# Patient Record
Sex: MALE | Race: WHITE | NOT HISPANIC OR LATINO | Employment: FULL TIME | URBAN - METROPOLITAN AREA
[De-identification: names, ages, dates, MRNs, and addresses within clinical notes are randomized per-mention and may not be internally consistent; named-entity substitution may affect disease eponyms.]

---

## 2018-01-01 ENCOUNTER — ANESTHESIA (OUTPATIENT)
Dept: GASTROENTEROLOGY | Facility: HOSPITAL | Age: 63
End: 2018-01-01

## 2018-01-01 ENCOUNTER — ANESTHESIA EVENT (OUTPATIENT)
Dept: GASTROENTEROLOGY | Facility: HOSPITAL | Age: 63
End: 2018-01-01

## 2018-01-01 ENCOUNTER — HOSPITAL ENCOUNTER (OUTPATIENT)
Facility: HOSPITAL | Age: 63
Setting detail: HOSPITAL OUTPATIENT SURGERY
Discharge: HOME | End: 2018-05-23
Attending: SURGERY | Admitting: SURGERY

## 2018-01-01 VITALS
RESPIRATION RATE: 16 BRPM | OXYGEN SATURATION: 100 % | SYSTOLIC BLOOD PRESSURE: 115 MMHG | DIASTOLIC BLOOD PRESSURE: 61 MMHG | HEIGHT: 68 IN | BODY MASS INDEX: 20.16 KG/M2 | HEART RATE: 68 BPM | WEIGHT: 133 LBS

## 2018-01-01 PROCEDURE — 45378 DIAGNOSTIC COLONOSCOPY: CPT | Performed by: SURGERY

## 2018-01-01 PROCEDURE — 25010000002 PROPOFOL 10 MG/ML EMULSION: Performed by: ANESTHESIOLOGY

## 2018-01-01 PROCEDURE — 25010000002 GLUCAGON (RDNA) PER 1 MG: Performed by: SURGERY

## 2018-01-01 RX ORDER — SODIUM CHLORIDE 0.9 % (FLUSH) 0.9 %
3 SYRINGE (ML) INJECTION AS NEEDED
Status: DISCONTINUED | OUTPATIENT
Start: 2018-01-01 | End: 2018-01-01 | Stop reason: HOSPADM

## 2018-01-01 RX ORDER — SODIUM CHLORIDE, SODIUM LACTATE, POTASSIUM CHLORIDE, CALCIUM CHLORIDE 600; 310; 30; 20 MG/100ML; MG/100ML; MG/100ML; MG/100ML
1000 INJECTION, SOLUTION INTRAVENOUS CONTINUOUS
Status: DISCONTINUED | OUTPATIENT
Start: 2018-01-01 | End: 2018-01-01 | Stop reason: HOSPADM

## 2018-01-01 RX ORDER — LIDOCAINE HYDROCHLORIDE 10 MG/ML
0.5 INJECTION, SOLUTION INFILTRATION; PERINEURAL ONCE AS NEEDED
Status: DISCONTINUED | OUTPATIENT
Start: 2018-01-01 | End: 2018-01-01 | Stop reason: HOSPADM

## 2018-01-01 RX ORDER — PROPOFOL 10 MG/ML
VIAL (ML) INTRAVENOUS AS NEEDED
Status: DISCONTINUED | OUTPATIENT
Start: 2018-01-01 | End: 2018-01-01 | Stop reason: SURG

## 2018-01-01 RX ADMIN — SODIUM CHLORIDE, POTASSIUM CHLORIDE, SODIUM LACTATE AND CALCIUM CHLORIDE 1000 ML: 600; 310; 30; 20 INJECTION, SOLUTION INTRAVENOUS at 10:06

## 2018-01-01 RX ADMIN — PROPOFOL 270 MG: 10 INJECTION, EMULSION INTRAVENOUS at 10:45

## 2018-01-01 RX ADMIN — SODIUM CHLORIDE, POTASSIUM CHLORIDE, SODIUM LACTATE AND CALCIUM CHLORIDE: 600; 310; 30; 20 INJECTION, SOLUTION INTRAVENOUS at 10:45

## 2018-03-01 ENCOUNTER — PREP FOR SURGERY (OUTPATIENT)
Dept: OTHER | Facility: HOSPITAL | Age: 63
End: 2018-03-01

## 2018-03-01 DIAGNOSIS — Z85.048 HISTORY OF RECTAL CANCER: Primary | ICD-10-CM

## 2018-03-05 PROBLEM — Z85.048 HISTORY OF RECTAL CANCER: Status: ACTIVE | Noted: 2018-03-05

## 2018-05-23 NOTE — DISCHARGE INSTRUCTIONS
For the next 24 hours patient needs to be with a responsible adult.    For 24 hours DO NOT drive, operate machinery, appliances, drink alcohol, make important decisions or sign legal documents.    Start with a light or bland diet and advance to regular diet as tolerated.    Call Dr Arreola for problems [phone]    Problems may include but not limited to: large amounts of bleeding, trouble breathing, repeated vomiting, severe unrelieved pain, fever or chills.

## 2018-05-23 NOTE — OP NOTE
PREOPERATIVE DIAGNOSIS:  High-risk screening secondary to personal history of rectal cancer    POSTOPERATIVE DIAGNOSIS AND FINDINGS:  Normal    PROCEDURE:  Colonoscopy to cecum     SURGEON:  Ben Arreola MD    ANESTHESIA:  MAC    SPECIMEN(S):  none    DESCRIPTION:  In decubitus position digital rectal exam was normal. Colonoscope inserted under direct visualization of lumen to cecum confirmed by visualization of ileocecal valve and appendiceal orifice.    Scope slowly withdrawn circumferentially examining all mucosal surfaces.    Quality of bowel preparation good.    There were no mucosal abnormalities.     Tolerated well.    RECOMMENDATION FOR FUTURE SURVEILLANCE:  5 years    Ben Arreola M.D.

## 2018-05-23 NOTE — ANESTHESIA PREPROCEDURE EVALUATION
Anesthesia Evaluation     Patient summary reviewed and Nursing notes reviewed   NPO Solid Status: > 8 hours  NPO Liquid Status: > 4 hours           Airway   Mallampati: II  TM distance: >3 FB  Neck ROM: full  no difficulty expected  Dental - normal exam     Pulmonary - normal exam   Cardiovascular - normal exam        Neuro/Psych  GI/Hepatic/Renal/Endo    (+)   hepatitis C, liver disease,     Musculoskeletal     Abdominal  - normal exam   Substance History      OB/GYN          Other      history of cancer remission                    Anesthesia Plan    ASA 3     MAC     Anesthetic plan and risks discussed with patient.

## 2018-05-23 NOTE — ANESTHESIA POSTPROCEDURE EVALUATION
"Patient: Jamel Rojas    Procedure Summary     Date:  05/23/18 Room / Location:  [facility] ENDOSCOPY 1 /  [facility]    Anesthesia Start:  1046 Anesthesia Stop:  1100    Procedure:  COLONOSCOPY TO CECUM (N/A ) Diagnosis:       History of rectal cancer      (History of rectal cancer [Z85.048])    Surgeon:  Ben Arreola MD Provider:  Cedric Sanchez MD    Anesthesia Type:  MAC ASA Status:  3          Anesthesia Type: MAC  Last vitals  BP   129/82 (05/23/18 0958)   Temp       Pulse   53 (05/23/18 0958)   Resp   15 (05/23/18 0958)     SpO2   100 % (05/23/18 0958)     Post Anesthesia Care and Evaluation    Patient location during evaluation: bedside  Patient participation: complete - patient participated  Level of consciousness: awake and alert  Pain management: adequate  Airway patency: patent  Anesthetic complications: No anesthetic complications    Cardiovascular status: acceptable  Respiratory status: acceptable  Hydration status: acceptable    Comments: /82 (BP Location: Left arm, Patient Position: Lying)   Pulse 53   Resp 15   Ht 172.7 cm (68\")   Wt 60.3 kg (133 lb)   SpO2 100%   BMI 20.22 kg/m²       "

## 2018-05-23 NOTE — H&P
HPI:  Personal history of rectal cancer status post laparoscopic low anterior resection    PMH, PSH, MEDS AND ALLERGIES reviewed and reconciled with EPIC    PHYSICAL EXAM:  -  Constitutional:  no acute distress  -  Respiratory:  normal inspiratory effort  -  Cardiovascular: regular rate  -  Gastrointestinal: Soft    ASSESSMENT/PLAN:    Colonoscopy    Ben Arreola M.D.

## 2019-01-01 ENCOUNTER — APPOINTMENT (OUTPATIENT)
Dept: GENERAL RADIOLOGY | Facility: HOSPITAL | Age: 64
End: 2019-01-01

## 2019-01-01 ENCOUNTER — APPOINTMENT (OUTPATIENT)
Dept: MRI IMAGING | Facility: HOSPITAL | Age: 64
End: 2019-01-01

## 2019-01-01 ENCOUNTER — APPOINTMENT (OUTPATIENT)
Dept: CT IMAGING | Facility: HOSPITAL | Age: 64
End: 2019-01-01

## 2019-01-01 ENCOUNTER — HOSPITAL ENCOUNTER (INPATIENT)
Facility: HOSPITAL | Age: 64
LOS: 7 days | End: 2019-03-18
Attending: EMERGENCY MEDICINE | Admitting: INTERNAL MEDICINE

## 2019-01-01 ENCOUNTER — HOSPITAL ENCOUNTER (INPATIENT)
Facility: HOSPITAL | Age: 64
LOS: 6 days | End: 2019-03-24
Attending: INTERNAL MEDICINE | Admitting: INTERNAL MEDICINE

## 2019-01-01 VITALS
RESPIRATION RATE: 18 BRPM | WEIGHT: 108 LBS | OXYGEN SATURATION: 94 % | HEIGHT: 68 IN | DIASTOLIC BLOOD PRESSURE: 76 MMHG | TEMPERATURE: 98.5 F | SYSTOLIC BLOOD PRESSURE: 113 MMHG | HEART RATE: 83 BPM | BODY MASS INDEX: 16.37 KG/M2

## 2019-01-01 VITALS — SYSTOLIC BLOOD PRESSURE: 103 MMHG | OXYGEN SATURATION: 93 % | DIASTOLIC BLOOD PRESSURE: 71 MMHG | TEMPERATURE: 98.1 F

## 2019-01-01 DIAGNOSIS — R26.2 DIFFICULTY WALKING: ICD-10-CM

## 2019-01-01 DIAGNOSIS — G93.89 BRAIN MASS: ICD-10-CM

## 2019-01-01 DIAGNOSIS — R41.82 ALTERED MENTAL STATUS, UNSPECIFIED ALTERED MENTAL STATUS TYPE: ICD-10-CM

## 2019-01-01 DIAGNOSIS — R41.82 ALTERED MENTAL STATUS, UNSPECIFIED ALTERED MENTAL STATUS TYPE: Primary | ICD-10-CM

## 2019-01-01 LAB
ALBUMIN SERPL-MCNC: 3.8 G/DL (ref 3.5–5.2)
ALBUMIN SERPL-MCNC: 4 G/DL (ref 3.5–5.2)
ALBUMIN SERPL-MCNC: 4.6 G/DL (ref 3.5–5.2)
ALBUMIN/GLOB SERPL: 1.5 G/DL
ALBUMIN/GLOB SERPL: 1.7 G/DL
ALBUMIN/GLOB SERPL: 1.8 G/DL
ALBUMIN/GLOB SERPL: 1.9 G/DL
ALBUMIN/GLOB SERPL: 1.9 G/DL
ALP SERPL-CCNC: 68 U/L (ref 39–117)
ALP SERPL-CCNC: 69 U/L (ref 39–117)
ALP SERPL-CCNC: 76 U/L (ref 39–117)
ALP SERPL-CCNC: 78 U/L (ref 39–117)
ALP SERPL-CCNC: 91 U/L (ref 39–117)
ALT SERPL W P-5'-P-CCNC: 16 U/L (ref 1–41)
ALT SERPL W P-5'-P-CCNC: 16 U/L (ref 1–41)
ALT SERPL W P-5'-P-CCNC: 19 U/L (ref 1–41)
ALT SERPL W P-5'-P-CCNC: 20 U/L (ref 1–41)
ALT SERPL W P-5'-P-CCNC: 23 U/L (ref 1–41)
AMPHET+METHAMPHET UR QL: NEGATIVE
ANION GAP SERPL CALCULATED.3IONS-SCNC: 11.9 MMOL/L
ANION GAP SERPL CALCULATED.3IONS-SCNC: 12.2 MMOL/L
ANION GAP SERPL CALCULATED.3IONS-SCNC: 14.7 MMOL/L
ANION GAP SERPL CALCULATED.3IONS-SCNC: 23.5 MMOL/L
ANION GAP SERPL CALCULATED.3IONS-SCNC: 8.9 MMOL/L
APAP SERPL-MCNC: <5 MCG/ML (ref 10–30)
AST SERPL-CCNC: 17 U/L (ref 1–40)
AST SERPL-CCNC: 19 U/L (ref 1–40)
AST SERPL-CCNC: 19 U/L (ref 1–40)
AST SERPL-CCNC: 21 U/L (ref 1–40)
AST SERPL-CCNC: 23 U/L (ref 1–40)
BACTERIA UR QL AUTO: ABNORMAL /HPF
BARBITURATES UR QL SCN: NEGATIVE
BASOPHILS # BLD AUTO: 0 10*3/MM3 (ref 0–0.2)
BASOPHILS # BLD AUTO: 0.01 10*3/MM3 (ref 0–0.2)
BASOPHILS # BLD AUTO: 0.02 10*3/MM3 (ref 0–0.2)
BASOPHILS NFR BLD AUTO: 0 % (ref 0–1.5)
BASOPHILS NFR BLD AUTO: 0.1 % (ref 0–1.5)
BASOPHILS NFR BLD AUTO: 0.2 % (ref 0–1.5)
BENZODIAZ UR QL SCN: NEGATIVE
BILIRUB SERPL-MCNC: 0.8 MG/DL (ref 0.1–1.2)
BILIRUB SERPL-MCNC: 0.8 MG/DL (ref 0.1–1.2)
BILIRUB SERPL-MCNC: 1.5 MG/DL (ref 0.1–1.2)
BILIRUB SERPL-MCNC: 1.6 MG/DL (ref 0.1–1.2)
BILIRUB SERPL-MCNC: 2.6 MG/DL (ref 0.1–1.2)
BILIRUB UR QL STRIP: NEGATIVE
BUN BLD-MCNC: 19 MG/DL (ref 8–23)
BUN BLD-MCNC: 20 MG/DL (ref 8–23)
BUN BLD-MCNC: 21 MG/DL (ref 8–23)
BUN BLD-MCNC: 28 MG/DL (ref 8–23)
BUN BLD-MCNC: 29 MG/DL (ref 8–23)
BUN/CREAT SERPL: 22.4 (ref 7–25)
BUN/CREAT SERPL: 23.5 (ref 7–25)
BUN/CREAT SERPL: 23.7 (ref 7–25)
BUN/CREAT SERPL: 24.7 (ref 7–25)
BUN/CREAT SERPL: 32.6 (ref 7–25)
CALCIUM SPEC-SCNC: 10.6 MG/DL (ref 8.6–10.5)
CALCIUM SPEC-SCNC: 9.1 MG/DL (ref 8.6–10.5)
CALCIUM SPEC-SCNC: 9.6 MG/DL (ref 8.6–10.5)
CALCIUM SPEC-SCNC: 9.6 MG/DL (ref 8.6–10.5)
CALCIUM SPEC-SCNC: 9.7 MG/DL (ref 8.6–10.5)
CANNABINOIDS SERPL QL: POSITIVE
CHLORIDE SERPL-SCNC: 103 MMOL/L (ref 98–107)
CHLORIDE SERPL-SCNC: 111 MMOL/L (ref 98–107)
CHLORIDE SERPL-SCNC: 113 MMOL/L (ref 98–107)
CHLORIDE SERPL-SCNC: 114 MMOL/L (ref 98–107)
CHLORIDE SERPL-SCNC: 117 MMOL/L (ref 98–107)
CK SERPL-CCNC: 201 U/L (ref 20–200)
CLARITY UR: ABNORMAL
CO2 SERPL-SCNC: 21.5 MMOL/L (ref 22–29)
CO2 SERPL-SCNC: 24.3 MMOL/L (ref 22–29)
CO2 SERPL-SCNC: 24.8 MMOL/L (ref 22–29)
CO2 SERPL-SCNC: 26.1 MMOL/L (ref 22–29)
CO2 SERPL-SCNC: 28.1 MMOL/L (ref 22–29)
COCAINE UR QL: NEGATIVE
COLOR UR: YELLOW
CREAT BLD-MCNC: 0.85 MG/DL (ref 0.76–1.27)
CREAT BLD-MCNC: 0.89 MG/DL (ref 0.76–1.27)
CREAT BLD-MCNC: 1.18 MG/DL (ref 0.76–1.27)
DEPRECATED RDW RBC AUTO: 45.1 FL (ref 37–54)
DEPRECATED RDW RBC AUTO: 45.2 FL (ref 37–54)
DEPRECATED RDW RBC AUTO: 46.1 FL (ref 37–54)
EOSINOPHIL # BLD AUTO: 0 10*3/MM3 (ref 0–0.4)
EOSINOPHIL NFR BLD AUTO: 0 % (ref 0.3–6.2)
ERYTHROCYTE [DISTWIDTH] IN BLOOD BY AUTOMATED COUNT: 13.1 % (ref 12.3–15.4)
ERYTHROCYTE [DISTWIDTH] IN BLOOD BY AUTOMATED COUNT: 13.1 % (ref 12.3–15.4)
ERYTHROCYTE [DISTWIDTH] IN BLOOD BY AUTOMATED COUNT: 13.3 % (ref 12.3–15.4)
ETHANOL BLD-MCNC: <10 MG/DL (ref 0–10)
ETHANOL UR QL: <0.01 %
GFR SERPL CREATININE-BSD FRML MDRD: 63 ML/MIN/1.73
GFR SERPL CREATININE-BSD FRML MDRD: 87 ML/MIN/1.73
GFR SERPL CREATININE-BSD FRML MDRD: 92 ML/MIN/1.73
GLOBULIN UR ELPH-MCNC: 2 GM/DL
GLOBULIN UR ELPH-MCNC: 2 GM/DL
GLOBULIN UR ELPH-MCNC: 2.1 GM/DL
GLOBULIN UR ELPH-MCNC: 2.6 GM/DL
GLOBULIN UR ELPH-MCNC: 2.7 GM/DL
GLUCOSE BLD-MCNC: 153 MG/DL (ref 65–99)
GLUCOSE BLD-MCNC: 168 MG/DL (ref 65–99)
GLUCOSE BLD-MCNC: 89 MG/DL (ref 65–99)
GLUCOSE BLD-MCNC: 89 MG/DL (ref 65–99)
GLUCOSE BLD-MCNC: 92 MG/DL (ref 65–99)
GLUCOSE BLDC GLUCOMTR-MCNC: 119 MG/DL (ref 70–130)
GLUCOSE BLDC GLUCOMTR-MCNC: 123 MG/DL (ref 70–130)
GLUCOSE BLDC GLUCOMTR-MCNC: 125 MG/DL (ref 70–130)
GLUCOSE BLDC GLUCOMTR-MCNC: 126 MG/DL (ref 70–130)
GLUCOSE BLDC GLUCOMTR-MCNC: 136 MG/DL (ref 70–130)
GLUCOSE BLDC GLUCOMTR-MCNC: 139 MG/DL (ref 70–130)
GLUCOSE BLDC GLUCOMTR-MCNC: 146 MG/DL (ref 70–130)
GLUCOSE BLDC GLUCOMTR-MCNC: 189 MG/DL (ref 70–130)
GLUCOSE BLDC GLUCOMTR-MCNC: 211 MG/DL (ref 70–130)
GLUCOSE BLDC GLUCOMTR-MCNC: 217 MG/DL (ref 70–130)
GLUCOSE BLDC GLUCOMTR-MCNC: 263 MG/DL (ref 70–130)
GLUCOSE BLDC GLUCOMTR-MCNC: 76 MG/DL (ref 70–130)
GLUCOSE BLDC GLUCOMTR-MCNC: 81 MG/DL (ref 70–130)
GLUCOSE BLDC GLUCOMTR-MCNC: 87 MG/DL (ref 70–130)
GLUCOSE BLDC GLUCOMTR-MCNC: 88 MG/DL (ref 70–130)
GLUCOSE BLDC GLUCOMTR-MCNC: 88 MG/DL (ref 70–130)
GLUCOSE UR STRIP-MCNC: NEGATIVE MG/DL
HCT VFR BLD AUTO: 44 % (ref 37.5–51)
HCT VFR BLD AUTO: 47.8 % (ref 37.5–51)
HCT VFR BLD AUTO: 53.6 % (ref 37.5–51)
HGB BLD-MCNC: 14.8 G/DL (ref 13–17.7)
HGB BLD-MCNC: 16.2 G/DL (ref 13–17.7)
HGB BLD-MCNC: 18 G/DL (ref 13–17.7)
HGB UR QL STRIP.AUTO: ABNORMAL
HYALINE CASTS UR QL AUTO: ABNORMAL /LPF
IMM GRANULOCYTES # BLD AUTO: 0.03 10*3/MM3 (ref 0–0.05)
IMM GRANULOCYTES # BLD AUTO: 0.03 10*3/MM3 (ref 0–0.05)
IMM GRANULOCYTES # BLD AUTO: 0.07 10*3/MM3 (ref 0–0.05)
IMM GRANULOCYTES NFR BLD AUTO: 0.3 % (ref 0–0.5)
IMM GRANULOCYTES NFR BLD AUTO: 0.3 % (ref 0–0.5)
IMM GRANULOCYTES NFR BLD AUTO: 0.6 % (ref 0–0.5)
INR PPP: 1.13 (ref 0.9–1.1)
KETONES UR QL STRIP: ABNORMAL
LEUKOCYTE ESTERASE UR QL STRIP.AUTO: ABNORMAL
LYMPHOCYTES # BLD AUTO: 0.36 10*3/MM3 (ref 0.7–3.1)
LYMPHOCYTES # BLD AUTO: 0.39 10*3/MM3 (ref 0.7–3.1)
LYMPHOCYTES # BLD AUTO: 0.61 10*3/MM3 (ref 0.7–3.1)
LYMPHOCYTES NFR BLD AUTO: 3.3 % (ref 19.6–45.3)
LYMPHOCYTES NFR BLD AUTO: 4.2 % (ref 19.6–45.3)
LYMPHOCYTES NFR BLD AUTO: 5.9 % (ref 19.6–45.3)
MCH RBC QN AUTO: 31.6 PG (ref 26.6–33)
MCH RBC QN AUTO: 31.6 PG (ref 26.6–33)
MCH RBC QN AUTO: 31.8 PG (ref 26.6–33)
MCHC RBC AUTO-ENTMCNC: 33.6 G/DL (ref 31.5–35.7)
MCHC RBC AUTO-ENTMCNC: 33.6 G/DL (ref 31.5–35.7)
MCHC RBC AUTO-ENTMCNC: 33.9 G/DL (ref 31.5–35.7)
MCV RBC AUTO: 93.7 FL (ref 79–97)
MCV RBC AUTO: 94 FL (ref 79–97)
MCV RBC AUTO: 94 FL (ref 79–97)
METHADONE UR QL SCN: NEGATIVE
MONOCYTES # BLD AUTO: 0.21 10*3/MM3 (ref 0.1–0.9)
MONOCYTES # BLD AUTO: 0.39 10*3/MM3 (ref 0.1–0.9)
MONOCYTES # BLD AUTO: 0.59 10*3/MM3 (ref 0.1–0.9)
MONOCYTES NFR BLD AUTO: 2.4 % (ref 5–12)
MONOCYTES NFR BLD AUTO: 3.3 % (ref 5–12)
MONOCYTES NFR BLD AUTO: 5.7 % (ref 5–12)
NEUTROPHILS # BLD AUTO: 11.13 10*3/MM3 (ref 1.4–7)
NEUTROPHILS # BLD AUTO: 8.07 10*3/MM3 (ref 1.4–7)
NEUTROPHILS # BLD AUTO: 9.16 10*3/MM3 (ref 1.4–7)
NEUTROPHILS NFR BLD AUTO: 87.9 % (ref 42.7–76)
NEUTROPHILS NFR BLD AUTO: 92.7 % (ref 42.7–76)
NEUTROPHILS NFR BLD AUTO: 93.1 % (ref 42.7–76)
NITRITE UR QL STRIP: NEGATIVE
NRBC BLD AUTO-RTO: 0 /100 WBC (ref 0–0)
OPIATES UR QL: NEGATIVE
OXYCODONE UR QL SCN: NEGATIVE
PH UR STRIP.AUTO: 5.5 [PH] (ref 5–8)
PLATELET # BLD AUTO: 181 10*3/MM3 (ref 140–450)
PLATELET # BLD AUTO: 216 10*3/MM3 (ref 140–450)
PLATELET # BLD AUTO: 244 10*3/MM3 (ref 140–450)
PMV BLD AUTO: 9.5 FL (ref 6–12)
PMV BLD AUTO: 9.6 FL (ref 6–12)
PMV BLD AUTO: 9.7 FL (ref 6–12)
POTASSIUM BLD-SCNC: 3.6 MMOL/L (ref 3.5–5.2)
POTASSIUM BLD-SCNC: 3.9 MMOL/L (ref 3.5–5.2)
POTASSIUM BLD-SCNC: 3.9 MMOL/L (ref 3.5–5.2)
POTASSIUM BLD-SCNC: 4 MMOL/L (ref 3.5–5.2)
POTASSIUM BLD-SCNC: 4.1 MMOL/L (ref 3.5–5.2)
PROT SERPL-MCNC: 5.8 G/DL (ref 6–8.5)
PROT SERPL-MCNC: 5.8 G/DL (ref 6–8.5)
PROT SERPL-MCNC: 5.9 G/DL (ref 6–8.5)
PROT SERPL-MCNC: 6.6 G/DL (ref 6–8.5)
PROT SERPL-MCNC: 7.3 G/DL (ref 6–8.5)
PROT UR QL STRIP: ABNORMAL
PROTHROMBIN TIME: 14.3 SECONDS (ref 11.7–14.2)
RBC # BLD AUTO: 4.68 10*6/MM3 (ref 4.14–5.8)
RBC # BLD AUTO: 5.1 10*6/MM3 (ref 4.14–5.8)
RBC # BLD AUTO: 5.7 10*6/MM3 (ref 4.14–5.8)
RBC # UR: ABNORMAL /HPF
REF LAB TEST METHOD: ABNORMAL
SALICYLATES SERPL-MCNC: <0.3 MG/DL
SODIUM BLD-SCNC: 148 MMOL/L (ref 136–145)
SODIUM BLD-SCNC: 151 MMOL/L (ref 136–145)
SODIUM BLD-SCNC: 151 MMOL/L (ref 136–145)
SODIUM BLD-SCNC: 152 MMOL/L (ref 136–145)
SODIUM BLD-SCNC: 152 MMOL/L (ref 136–145)
SP GR UR STRIP: 1.02 (ref 1–1.03)
SQUAMOUS #/AREA URNS HPF: ABNORMAL /HPF
UROBILINOGEN UR QL STRIP: ABNORMAL
WBC NRBC COR # BLD: 10.41 10*3/MM3 (ref 3.4–10.8)
WBC NRBC COR # BLD: 11.99 10*3/MM3 (ref 3.4–10.8)
WBC NRBC COR # BLD: 8.67 10*3/MM3 (ref 3.4–10.8)
WBC UR QL AUTO: ABNORMAL /HPF

## 2019-01-01 PROCEDURE — 25010000002 DEXAMETHASONE SODIUM PHOSPHATE 20 MG/5ML SOLUTION: Performed by: NURSE PRACTITIONER

## 2019-01-01 PROCEDURE — 25010000003 LEVETIRACETAM IN NACL 0.75% 1000 MG/100ML SOLUTION: Performed by: INTERNAL MEDICINE

## 2019-01-01 PROCEDURE — 25010000002 HYDROMORPHONE PER 4 MG: Performed by: INTERNAL MEDICINE

## 2019-01-01 PROCEDURE — 80307 DRUG TEST PRSMV CHEM ANLYZR: CPT | Performed by: EMERGENCY MEDICINE

## 2019-01-01 PROCEDURE — 25010000002 HYDROMORPHONE 1 MG/ML SOLUTION: Performed by: INTERNAL MEDICINE

## 2019-01-01 PROCEDURE — 97110 THERAPEUTIC EXERCISES: CPT

## 2019-01-01 PROCEDURE — 94799 UNLISTED PULMONARY SVC/PX: CPT

## 2019-01-01 PROCEDURE — 25010000002 DEXAMETHASONE PER 1 MG: Performed by: INTERNAL MEDICINE

## 2019-01-01 PROCEDURE — 25010000002 LORAZEPAM PER 2 MG: Performed by: HOSPITALIST

## 2019-01-01 PROCEDURE — 85610 PROTHROMBIN TIME: CPT | Performed by: INTERNAL MEDICINE

## 2019-01-01 PROCEDURE — 82962 GLUCOSE BLOOD TEST: CPT

## 2019-01-01 PROCEDURE — 99231 SBSQ HOSP IP/OBS SF/LOW 25: CPT | Performed by: INTERNAL MEDICINE

## 2019-01-01 PROCEDURE — 63710000001 INSULIN LISPRO (HUMAN) PER 5 UNITS: Performed by: INTERNAL MEDICINE

## 2019-01-01 PROCEDURE — 80053 COMPREHEN METABOLIC PANEL: CPT | Performed by: INTERNAL MEDICINE

## 2019-01-01 PROCEDURE — 25010000002 IOPAMIDOL 61 % SOLUTION: Performed by: INTERNAL MEDICINE

## 2019-01-01 PROCEDURE — 93010 ELECTROCARDIOGRAM REPORT: CPT | Performed by: INTERNAL MEDICINE

## 2019-01-01 PROCEDURE — 25010000002 HYDROMORPHONE PER 4 MG: Performed by: HOSPITALIST

## 2019-01-01 PROCEDURE — 25010000002 LORAZEPAM PER 2 MG: Performed by: INTERNAL MEDICINE

## 2019-01-01 PROCEDURE — 99232 SBSQ HOSP IP/OBS MODERATE 35: CPT | Performed by: INTERNAL MEDICINE

## 2019-01-01 PROCEDURE — 99221 1ST HOSP IP/OBS SF/LOW 40: CPT | Performed by: INTERNAL MEDICINE

## 2019-01-01 PROCEDURE — 99222 1ST HOSP IP/OBS MODERATE 55: CPT | Performed by: INTERNAL MEDICINE

## 2019-01-01 PROCEDURE — 85025 COMPLETE CBC W/AUTO DIFF WBC: CPT | Performed by: INTERNAL MEDICINE

## 2019-01-01 PROCEDURE — 99238 HOSP IP/OBS DSCHRG MGMT 30/<: CPT | Performed by: INTERNAL MEDICINE

## 2019-01-01 PROCEDURE — 0 GADOBENATE DIMEGLUMINE 529 MG/ML SOLUTION: Performed by: INTERNAL MEDICINE

## 2019-01-01 PROCEDURE — 93005 ELECTROCARDIOGRAM TRACING: CPT | Performed by: EMERGENCY MEDICINE

## 2019-01-01 PROCEDURE — P9612 CATHETERIZE FOR URINE SPEC: HCPCS

## 2019-01-01 PROCEDURE — 74177 CT ABD & PELVIS W/CONTRAST: CPT

## 2019-01-01 PROCEDURE — 82550 ASSAY OF CK (CPK): CPT | Performed by: EMERGENCY MEDICINE

## 2019-01-01 PROCEDURE — 71260 CT THORAX DX C+: CPT

## 2019-01-01 PROCEDURE — 25010000002 DEXAMETHASONE SODIUM PHOSPHATE 20 MG/5ML SOLUTION: Performed by: INTERNAL MEDICINE

## 2019-01-01 PROCEDURE — 99232 SBSQ HOSP IP/OBS MODERATE 35: CPT | Performed by: NURSE PRACTITIONER

## 2019-01-01 PROCEDURE — 71045 X-RAY EXAM CHEST 1 VIEW: CPT

## 2019-01-01 PROCEDURE — 99254 IP/OBS CNSLTJ NEW/EST MOD 60: CPT | Performed by: NURSE PRACTITIONER

## 2019-01-01 PROCEDURE — 81001 URINALYSIS AUTO W/SCOPE: CPT | Performed by: EMERGENCY MEDICINE

## 2019-01-01 PROCEDURE — 25010000003 LEVETIRACETAM IN NACL 0.75% 1000 MG/100ML SOLUTION: Performed by: EMERGENCY MEDICINE

## 2019-01-01 PROCEDURE — 70450 CT HEAD/BRAIN W/O DYE: CPT

## 2019-01-01 PROCEDURE — 99232 SBSQ HOSP IP/OBS MODERATE 35: CPT | Performed by: NEUROLOGICAL SURGERY

## 2019-01-01 PROCEDURE — 25010000002 ONDANSETRON PER 1 MG: Performed by: INTERNAL MEDICINE

## 2019-01-01 PROCEDURE — 97162 PT EVAL MOD COMPLEX 30 MIN: CPT

## 2019-01-01 PROCEDURE — 99285 EMERGENCY DEPT VISIT HI MDM: CPT

## 2019-01-01 PROCEDURE — 80053 COMPREHEN METABOLIC PANEL: CPT | Performed by: EMERGENCY MEDICINE

## 2019-01-01 PROCEDURE — A9577 INJ MULTIHANCE: HCPCS | Performed by: INTERNAL MEDICINE

## 2019-01-01 PROCEDURE — 25010000002 CEFTRIAXONE PER 250 MG: Performed by: INTERNAL MEDICINE

## 2019-01-01 PROCEDURE — 70553 MRI BRAIN STEM W/O & W/DYE: CPT

## 2019-01-01 PROCEDURE — 85025 COMPLETE CBC W/AUTO DIFF WBC: CPT | Performed by: EMERGENCY MEDICINE

## 2019-01-01 PROCEDURE — 99239 HOSP IP/OBS DSCHRG MGMT >30: CPT | Performed by: INTERNAL MEDICINE

## 2019-01-01 RX ORDER — MORPHINE SULFATE 2 MG/ML
4 INJECTION, SOLUTION INTRAMUSCULAR; INTRAVENOUS
Status: CANCELLED | OUTPATIENT
Start: 2019-01-01 | End: 2019-03-25

## 2019-01-01 RX ORDER — LEVETIRACETAM 10 MG/ML
1000 INJECTION INTRAVASCULAR ONCE
Status: COMPLETED | OUTPATIENT
Start: 2019-01-01 | End: 2019-01-01

## 2019-01-01 RX ORDER — LEVETIRACETAM 10 MG/ML
1000 INJECTION INTRAVASCULAR EVERY 12 HOURS SCHEDULED
Status: DISCONTINUED | OUTPATIENT
Start: 2019-01-01 | End: 2019-01-01 | Stop reason: HOSPADM

## 2019-01-01 RX ORDER — POTASSIUM CHLORIDE 1.5 G/1.77G
40 POWDER, FOR SOLUTION ORAL AS NEEDED
Status: DISCONTINUED | OUTPATIENT
Start: 2019-01-01 | End: 2019-01-01

## 2019-01-01 RX ORDER — SENNA AND DOCUSATE SODIUM 50; 8.6 MG/1; MG/1
2 TABLET, FILM COATED ORAL NIGHTLY
Status: DISCONTINUED | OUTPATIENT
Start: 2019-01-01 | End: 2019-01-01

## 2019-01-01 RX ORDER — ACETAMINOPHEN 650 MG/1
650 SUPPOSITORY RECTAL EVERY 4 HOURS PRN
Status: CANCELLED | OUTPATIENT
Start: 2019-01-01

## 2019-01-01 RX ORDER — LEVETIRACETAM 10 MG/ML
1000 INJECTION INTRAVASCULAR EVERY 12 HOURS SCHEDULED
Status: CANCELLED | OUTPATIENT
Start: 2019-01-01

## 2019-01-01 RX ORDER — MORPHINE SULFATE 20 MG/ML
5 SOLUTION ORAL
Status: DISCONTINUED | OUTPATIENT
Start: 2019-01-01 | End: 2019-01-01 | Stop reason: HOSPADM

## 2019-01-01 RX ORDER — GLYCOPYRROLATE 0.2 MG/ML
0.4 INJECTION INTRAMUSCULAR; INTRAVENOUS
Status: DISCONTINUED | OUTPATIENT
Start: 2019-01-01 | End: 2019-01-01

## 2019-01-01 RX ORDER — DEXTROSE MONOHYDRATE 25 G/50ML
25 INJECTION, SOLUTION INTRAVENOUS
Status: DISCONTINUED | OUTPATIENT
Start: 2019-01-01 | End: 2019-01-01

## 2019-01-01 RX ORDER — GLYCOPYRROLATE 0.2 MG/ML
0.2 INJECTION INTRAMUSCULAR; INTRAVENOUS
Status: DISCONTINUED | OUTPATIENT
Start: 2019-01-01 | End: 2019-01-01

## 2019-01-01 RX ORDER — SCOLOPAMINE TRANSDERMAL SYSTEM 1 MG/1
1 PATCH, EXTENDED RELEASE TRANSDERMAL
Status: CANCELLED | OUTPATIENT
Start: 2019-01-01

## 2019-01-01 RX ORDER — DIPHENOXYLATE HYDROCHLORIDE AND ATROPINE SULFATE 2.5; .025 MG/1; MG/1
1 TABLET ORAL
Status: DISCONTINUED | OUTPATIENT
Start: 2019-01-01 | End: 2019-01-01 | Stop reason: HOSPADM

## 2019-01-01 RX ORDER — LORAZEPAM 2 MG/ML
2 INJECTION INTRAMUSCULAR
Status: CANCELLED | OUTPATIENT
Start: 2019-01-01 | End: 2019-03-25

## 2019-01-01 RX ORDER — LORAZEPAM 2 MG/ML
1 CONCENTRATE ORAL
Status: DISCONTINUED | OUTPATIENT
Start: 2019-01-01 | End: 2019-01-01 | Stop reason: HOSPADM

## 2019-01-01 RX ORDER — POTASSIUM CHLORIDE 750 MG/1
40 CAPSULE, EXTENDED RELEASE ORAL AS NEEDED
Status: DISCONTINUED | OUTPATIENT
Start: 2019-01-01 | End: 2019-01-01

## 2019-01-01 RX ORDER — LORAZEPAM 2 MG/ML
1 INJECTION INTRAMUSCULAR
Status: CANCELLED | OUTPATIENT
Start: 2019-01-01 | End: 2019-03-25

## 2019-01-01 RX ORDER — GLYCOPYRROLATE 0.2 MG/ML
0.4 INJECTION INTRAMUSCULAR; INTRAVENOUS
Status: DISCONTINUED | OUTPATIENT
Start: 2019-01-01 | End: 2019-01-01 | Stop reason: HOSPADM

## 2019-01-01 RX ORDER — ACETAMINOPHEN 160 MG/5ML
650 SOLUTION ORAL EVERY 4 HOURS PRN
Status: DISCONTINUED | OUTPATIENT
Start: 2019-01-01 | End: 2019-01-01 | Stop reason: HOSPADM

## 2019-01-01 RX ORDER — ACETAMINOPHEN 325 MG/1
650 TABLET ORAL EVERY 4 HOURS PRN
Status: DISCONTINUED | OUTPATIENT
Start: 2019-01-01 | End: 2019-01-01 | Stop reason: HOSPADM

## 2019-01-01 RX ORDER — POTASSIUM CHLORIDE 750 MG/1
40 CAPSULE, EXTENDED RELEASE ORAL AS NEEDED
Status: CANCELLED | OUTPATIENT
Start: 2019-01-01

## 2019-01-01 RX ORDER — GLYCOPYRROLATE 0.2 MG/ML
0.2 INJECTION INTRAMUSCULAR; INTRAVENOUS
Status: CANCELLED | OUTPATIENT
Start: 2019-01-01

## 2019-01-01 RX ORDER — HALOPERIDOL 2 MG/ML
1 SOLUTION ORAL EVERY 4 HOURS PRN
Status: DISCONTINUED | OUTPATIENT
Start: 2019-01-01 | End: 2019-01-01 | Stop reason: HOSPADM

## 2019-01-01 RX ORDER — DEXTROSE MONOHYDRATE 25 G/50ML
25 INJECTION, SOLUTION INTRAVENOUS
Status: CANCELLED | OUTPATIENT
Start: 2019-01-01

## 2019-01-01 RX ORDER — SENNA AND DOCUSATE SODIUM 50; 8.6 MG/1; MG/1
2 TABLET, FILM COATED ORAL NIGHTLY PRN
Status: CANCELLED | OUTPATIENT
Start: 2019-01-01

## 2019-01-01 RX ORDER — MORPHINE SULFATE 20 MG/ML
10 SOLUTION ORAL
Status: DISCONTINUED | OUTPATIENT
Start: 2019-01-01 | End: 2019-01-01 | Stop reason: HOSPADM

## 2019-01-01 RX ORDER — PROMETHAZINE HYDROCHLORIDE 12.5 MG/1
12.5 SUPPOSITORY RECTAL EVERY 4 HOURS PRN
Status: DISCONTINUED | OUTPATIENT
Start: 2019-01-01 | End: 2019-01-01

## 2019-01-01 RX ORDER — ACETAMINOPHEN 325 MG/1
650 TABLET ORAL EVERY 4 HOURS PRN
Status: CANCELLED | OUTPATIENT
Start: 2019-01-01

## 2019-01-01 RX ORDER — MORPHINE SULFATE 2 MG/ML
6 INJECTION, SOLUTION INTRAMUSCULAR; INTRAVENOUS
Status: DISCONTINUED | OUTPATIENT
Start: 2019-01-01 | End: 2019-01-01 | Stop reason: HOSPADM

## 2019-01-01 RX ORDER — LORAZEPAM 2 MG/ML
1 CONCENTRATE ORAL
Status: CANCELLED | OUTPATIENT
Start: 2019-01-01 | End: 2019-03-25

## 2019-01-01 RX ORDER — SODIUM CHLORIDE 0.9 % (FLUSH) 0.9 %
3-10 SYRINGE (ML) INJECTION AS NEEDED
Status: DISCONTINUED | OUTPATIENT
Start: 2019-01-01 | End: 2019-01-01

## 2019-01-01 RX ORDER — PROMETHAZINE HYDROCHLORIDE 25 MG/1
12.5 TABLET ORAL EVERY 4 HOURS PRN
Status: DISCONTINUED | OUTPATIENT
Start: 2019-01-01 | End: 2019-01-01 | Stop reason: HOSPADM

## 2019-01-01 RX ORDER — MORPHINE SULFATE 2 MG/ML
2 INJECTION, SOLUTION INTRAMUSCULAR; INTRAVENOUS
Status: CANCELLED | OUTPATIENT
Start: 2019-01-01 | End: 2019-03-25

## 2019-01-01 RX ORDER — PROMETHAZINE HYDROCHLORIDE 6.25 MG/5ML
12.5 SYRUP ORAL EVERY 4 HOURS PRN
Status: CANCELLED | OUTPATIENT
Start: 2019-01-01

## 2019-01-01 RX ORDER — NICOTINE POLACRILEX 4 MG
15 LOZENGE BUCCAL
Status: CANCELLED | OUTPATIENT
Start: 2019-01-01

## 2019-01-01 RX ORDER — POTASSIUM CHLORIDE 7.45 MG/ML
10 INJECTION INTRAVENOUS
Status: CANCELLED | OUTPATIENT
Start: 2019-01-01

## 2019-01-01 RX ORDER — PROMETHAZINE HYDROCHLORIDE 6.25 MG/5ML
12.5 SYRUP ORAL EVERY 4 HOURS PRN
Status: DISCONTINUED | OUTPATIENT
Start: 2019-01-01 | End: 2019-01-01 | Stop reason: HOSPADM

## 2019-01-01 RX ORDER — LORAZEPAM 2 MG/ML
0.5 CONCENTRATE ORAL
Status: DISCONTINUED | OUTPATIENT
Start: 2019-01-01 | End: 2019-01-01 | Stop reason: HOSPADM

## 2019-01-01 RX ORDER — ONDANSETRON 4 MG/1
4 TABLET, FILM COATED ORAL EVERY 6 HOURS PRN
Status: DISCONTINUED | OUTPATIENT
Start: 2019-01-01 | End: 2019-01-01

## 2019-01-01 RX ORDER — FAMOTIDINE 10 MG/ML
20 INJECTION, SOLUTION INTRAVENOUS DAILY
Status: DISCONTINUED | OUTPATIENT
Start: 2019-01-01 | End: 2019-01-01

## 2019-01-01 RX ORDER — LORAZEPAM 2 MG/ML
0.5 INJECTION INTRAMUSCULAR
Status: DISCONTINUED | OUTPATIENT
Start: 2019-01-01 | End: 2019-01-01 | Stop reason: HOSPADM

## 2019-01-01 RX ORDER — PROMETHAZINE HYDROCHLORIDE 6.25 MG/5ML
12.5 SYRUP ORAL EVERY 4 HOURS PRN
Status: DISCONTINUED | OUTPATIENT
Start: 2019-01-01 | End: 2019-01-01

## 2019-01-01 RX ORDER — ONDANSETRON 4 MG/1
4 TABLET, FILM COATED ORAL EVERY 6 HOURS PRN
Status: DISCONTINUED | OUTPATIENT
Start: 2019-01-01 | End: 2019-01-01 | Stop reason: HOSPADM

## 2019-01-01 RX ORDER — DEXAMETHASONE SODIUM PHOSPHATE 4 MG/ML
4 INJECTION, SOLUTION INTRA-ARTICULAR; INTRALESIONAL; INTRAMUSCULAR; INTRAVENOUS; SOFT TISSUE EVERY 12 HOURS
Status: DISCONTINUED | OUTPATIENT
Start: 2019-01-01 | End: 2019-01-01 | Stop reason: HOSPADM

## 2019-01-01 RX ORDER — DIPHENOXYLATE HYDROCHLORIDE AND ATROPINE SULFATE 2.5; .025 MG/1; MG/1
1 TABLET ORAL
Status: CANCELLED | OUTPATIENT
Start: 2019-01-01

## 2019-01-01 RX ORDER — ONDANSETRON 4 MG/1
4 TABLET, ORALLY DISINTEGRATING ORAL EVERY 6 HOURS PRN
Status: DISCONTINUED | OUTPATIENT
Start: 2019-01-01 | End: 2019-01-01 | Stop reason: HOSPADM

## 2019-01-01 RX ORDER — HALOPERIDOL 1 MG/1
1 TABLET ORAL EVERY 4 HOURS PRN
Status: DISCONTINUED | OUTPATIENT
Start: 2019-01-01 | End: 2019-01-01 | Stop reason: HOSPADM

## 2019-01-01 RX ORDER — ONDANSETRON 2 MG/ML
4 INJECTION INTRAMUSCULAR; INTRAVENOUS EVERY 6 HOURS PRN
Status: DISCONTINUED | OUTPATIENT
Start: 2019-01-01 | End: 2019-01-01 | Stop reason: HOSPADM

## 2019-01-01 RX ORDER — GLYCOPYRROLATE 0.2 MG/ML
0.2 INJECTION INTRAMUSCULAR; INTRAVENOUS
Status: DISCONTINUED | OUTPATIENT
Start: 2019-01-01 | End: 2019-01-01 | Stop reason: HOSPADM

## 2019-01-01 RX ORDER — SODIUM CHLORIDE 0.9 % (FLUSH) 0.9 %
3 SYRINGE (ML) INJECTION EVERY 12 HOURS SCHEDULED
Status: DISCONTINUED | OUTPATIENT
Start: 2019-01-01 | End: 2019-01-01

## 2019-01-01 RX ORDER — LORAZEPAM 2 MG/ML
2 INJECTION INTRAMUSCULAR
Status: DISCONTINUED | OUTPATIENT
Start: 2019-01-01 | End: 2019-01-01 | Stop reason: HOSPADM

## 2019-01-01 RX ORDER — HYDROMORPHONE HYDROCHLORIDE 1 MG/ML
0.5 INJECTION, SOLUTION INTRAMUSCULAR; INTRAVENOUS; SUBCUTANEOUS
Status: CANCELLED | OUTPATIENT
Start: 2019-01-01 | End: 2019-03-25

## 2019-01-01 RX ORDER — PROMETHAZINE HYDROCHLORIDE 25 MG/1
12.5 TABLET ORAL EVERY 4 HOURS PRN
Status: DISCONTINUED | OUTPATIENT
Start: 2019-01-01 | End: 2019-01-01

## 2019-01-01 RX ORDER — MORPHINE SULFATE 10 MG/ML
6 INJECTION INTRAMUSCULAR; INTRAVENOUS; SUBCUTANEOUS
Status: CANCELLED | OUTPATIENT
Start: 2019-01-01 | End: 2019-03-25

## 2019-01-01 RX ORDER — PROMETHAZINE HYDROCHLORIDE 25 MG/ML
12.5 INJECTION, SOLUTION INTRAMUSCULAR; INTRAVENOUS EVERY 4 HOURS PRN
Status: CANCELLED | OUTPATIENT
Start: 2019-01-01

## 2019-01-01 RX ORDER — CEFTRIAXONE SODIUM 1 G/50ML
1 INJECTION, SOLUTION INTRAVENOUS EVERY 24 HOURS
Status: DISCONTINUED | OUTPATIENT
Start: 2019-01-01 | End: 2019-01-01

## 2019-01-01 RX ORDER — PROMETHAZINE HYDROCHLORIDE 12.5 MG/1
12.5 SUPPOSITORY RECTAL EVERY 4 HOURS PRN
Status: DISCONTINUED | OUTPATIENT
Start: 2019-01-01 | End: 2019-01-01 | Stop reason: HOSPADM

## 2019-01-01 RX ORDER — HALOPERIDOL 1 MG/1
1 TABLET ORAL EVERY 4 HOURS PRN
Status: DISCONTINUED | OUTPATIENT
Start: 2019-01-01 | End: 2019-01-01

## 2019-01-01 RX ORDER — SENNA AND DOCUSATE SODIUM 50; 8.6 MG/1; MG/1
2 TABLET, FILM COATED ORAL NIGHTLY PRN
Status: DISCONTINUED | OUTPATIENT
Start: 2019-01-01 | End: 2019-01-01

## 2019-01-01 RX ORDER — MORPHINE SULFATE 20 MG/ML
20 SOLUTION ORAL
Status: CANCELLED | OUTPATIENT
Start: 2019-01-01 | End: 2019-03-25

## 2019-01-01 RX ORDER — LORAZEPAM 2 MG/ML
2 CONCENTRATE ORAL
Status: CANCELLED | OUTPATIENT
Start: 2019-01-01 | End: 2019-03-25

## 2019-01-01 RX ORDER — POTASSIUM CHLORIDE 7.45 MG/ML
10 INJECTION INTRAVENOUS
Status: DISCONTINUED | OUTPATIENT
Start: 2019-01-01 | End: 2019-01-01

## 2019-01-01 RX ORDER — LORAZEPAM 2 MG/ML
1 INJECTION INTRAMUSCULAR
Status: DISCONTINUED | OUTPATIENT
Start: 2019-01-01 | End: 2019-01-01 | Stop reason: HOSPADM

## 2019-01-01 RX ORDER — MORPHINE SULFATE 2 MG/ML
4 INJECTION, SOLUTION INTRAMUSCULAR; INTRAVENOUS
Status: DISCONTINUED | OUTPATIENT
Start: 2019-01-01 | End: 2019-01-01 | Stop reason: HOSPADM

## 2019-01-01 RX ORDER — MORPHINE SULFATE 20 MG/ML
20 SOLUTION ORAL
Status: DISCONTINUED | OUTPATIENT
Start: 2019-01-01 | End: 2019-01-01 | Stop reason: HOSPADM

## 2019-01-01 RX ORDER — DEXAMETHASONE SODIUM PHOSPHATE 4 MG/ML
4 INJECTION, SOLUTION INTRA-ARTICULAR; INTRALESIONAL; INTRAMUSCULAR; INTRAVENOUS; SOFT TISSUE EVERY 8 HOURS
Status: DISCONTINUED | OUTPATIENT
Start: 2019-01-01 | End: 2019-01-01

## 2019-01-01 RX ORDER — DEXTROSE AND SODIUM CHLORIDE 5; .45 G/100ML; G/100ML
50 INJECTION, SOLUTION INTRAVENOUS CONTINUOUS
Status: DISCONTINUED | OUTPATIENT
Start: 2019-01-01 | End: 2019-01-01

## 2019-01-01 RX ORDER — MORPHINE SULFATE 20 MG/ML
5 SOLUTION ORAL
Status: CANCELLED | OUTPATIENT
Start: 2019-01-01 | End: 2019-03-25

## 2019-01-01 RX ORDER — LORAZEPAM 2 MG/ML
2 CONCENTRATE ORAL
Status: DISCONTINUED | OUTPATIENT
Start: 2019-01-01 | End: 2019-01-01 | Stop reason: HOSPADM

## 2019-01-01 RX ORDER — ACETAMINOPHEN 650 MG/1
650 SUPPOSITORY RECTAL EVERY 4 HOURS PRN
Status: DISCONTINUED | OUTPATIENT
Start: 2019-01-01 | End: 2019-01-01

## 2019-01-01 RX ORDER — ONDANSETRON 2 MG/ML
4 INJECTION INTRAMUSCULAR; INTRAVENOUS EVERY 6 HOURS PRN
Status: CANCELLED | OUTPATIENT
Start: 2019-01-01

## 2019-01-01 RX ORDER — IPRATROPIUM BROMIDE AND ALBUTEROL SULFATE 2.5; .5 MG/3ML; MG/3ML
3 SOLUTION RESPIRATORY (INHALATION)
Status: DISCONTINUED | OUTPATIENT
Start: 2019-01-01 | End: 2019-01-01

## 2019-01-01 RX ORDER — HALOPERIDOL 2 MG/ML
1 SOLUTION ORAL EVERY 4 HOURS PRN
Status: DISCONTINUED | OUTPATIENT
Start: 2019-01-01 | End: 2019-01-01

## 2019-01-01 RX ORDER — GLYCOPYRROLATE 0.2 MG/ML
0.4 INJECTION INTRAMUSCULAR; INTRAVENOUS
Status: CANCELLED | OUTPATIENT
Start: 2019-01-01

## 2019-01-01 RX ORDER — ACETAMINOPHEN 160 MG/5ML
650 SOLUTION ORAL EVERY 4 HOURS PRN
Status: CANCELLED | OUTPATIENT
Start: 2019-01-01

## 2019-01-01 RX ORDER — ACETAMINOPHEN 650 MG/1
650 SUPPOSITORY RECTAL EVERY 4 HOURS PRN
Status: DISCONTINUED | OUTPATIENT
Start: 2019-01-01 | End: 2019-01-01 | Stop reason: HOSPADM

## 2019-01-01 RX ORDER — POTASSIUM CHLORIDE 1.5 G/1.77G
40 POWDER, FOR SOLUTION ORAL AS NEEDED
Status: CANCELLED | OUTPATIENT
Start: 2019-01-01

## 2019-01-01 RX ORDER — LORAZEPAM 2 MG/ML
0.5 CONCENTRATE ORAL
Status: CANCELLED | OUTPATIENT
Start: 2019-01-01 | End: 2019-03-25

## 2019-01-01 RX ORDER — HYDROMORPHONE HYDROCHLORIDE 1 MG/ML
0.5 INJECTION, SOLUTION INTRAMUSCULAR; INTRAVENOUS; SUBCUTANEOUS
Status: DISCONTINUED | OUTPATIENT
Start: 2019-01-01 | End: 2019-01-01 | Stop reason: HOSPADM

## 2019-01-01 RX ORDER — SCOLOPAMINE TRANSDERMAL SYSTEM 1 MG/1
1 PATCH, EXTENDED RELEASE TRANSDERMAL
Status: DISCONTINUED | OUTPATIENT
Start: 2019-01-01 | End: 2019-01-01 | Stop reason: HOSPADM

## 2019-01-01 RX ORDER — HALOPERIDOL 1 MG/1
1 TABLET ORAL EVERY 4 HOURS PRN
Status: CANCELLED | OUTPATIENT
Start: 2019-01-01

## 2019-01-01 RX ORDER — ONDANSETRON 4 MG/1
4 TABLET, ORALLY DISINTEGRATING ORAL EVERY 6 HOURS PRN
Status: CANCELLED | OUTPATIENT
Start: 2019-01-01

## 2019-01-01 RX ORDER — ACETAMINOPHEN 325 MG/1
650 TABLET ORAL EVERY 4 HOURS PRN
Status: DISCONTINUED | OUTPATIENT
Start: 2019-01-01 | End: 2019-01-01

## 2019-01-01 RX ORDER — DEXAMETHASONE SODIUM PHOSPHATE 4 MG/ML
4 INJECTION, SOLUTION INTRA-ARTICULAR; INTRALESIONAL; INTRAMUSCULAR; INTRAVENOUS; SOFT TISSUE EVERY 8 HOURS
Status: DISCONTINUED | OUTPATIENT
Start: 2019-01-01 | End: 2019-01-01 | Stop reason: HOSPADM

## 2019-01-01 RX ORDER — PROMETHAZINE HYDROCHLORIDE 25 MG/ML
12.5 INJECTION, SOLUTION INTRAMUSCULAR; INTRAVENOUS EVERY 4 HOURS PRN
Status: DISCONTINUED | OUTPATIENT
Start: 2019-01-01 | End: 2019-01-01

## 2019-01-01 RX ORDER — ONDANSETRON 4 MG/1
4 TABLET, FILM COATED ORAL EVERY 6 HOURS PRN
Status: CANCELLED | OUTPATIENT
Start: 2019-01-01

## 2019-01-01 RX ORDER — HALOPERIDOL 5 MG/ML
1 INJECTION INTRAMUSCULAR EVERY 4 HOURS PRN
Status: DISCONTINUED | OUTPATIENT
Start: 2019-01-01 | End: 2019-01-01

## 2019-01-01 RX ORDER — SODIUM CHLORIDE 0.9 % (FLUSH) 0.9 %
3-10 SYRINGE (ML) INJECTION AS NEEDED
Status: CANCELLED | OUTPATIENT
Start: 2019-01-01

## 2019-01-01 RX ORDER — NICOTINE POLACRILEX 4 MG
15 LOZENGE BUCCAL
Status: DISCONTINUED | OUTPATIENT
Start: 2019-01-01 | End: 2019-01-01

## 2019-01-01 RX ORDER — MORPHINE SULFATE 20 MG/ML
10 SOLUTION ORAL
Status: CANCELLED | OUTPATIENT
Start: 2019-01-01 | End: 2019-03-25

## 2019-01-01 RX ORDER — MORPHINE SULFATE 2 MG/ML
2 INJECTION, SOLUTION INTRAMUSCULAR; INTRAVENOUS
Status: DISCONTINUED | OUTPATIENT
Start: 2019-01-01 | End: 2019-01-01 | Stop reason: HOSPADM

## 2019-01-01 RX ORDER — HALOPERIDOL 5 MG/ML
1 INJECTION INTRAMUSCULAR EVERY 4 HOURS PRN
Status: DISCONTINUED | OUTPATIENT
Start: 2019-01-01 | End: 2019-01-01 | Stop reason: HOSPADM

## 2019-01-01 RX ORDER — HALOPERIDOL 2 MG/ML
1 SOLUTION ORAL EVERY 4 HOURS PRN
Status: CANCELLED | OUTPATIENT
Start: 2019-01-01

## 2019-01-01 RX ORDER — DEXAMETHASONE SODIUM PHOSPHATE 4 MG/ML
4 INJECTION, SOLUTION INTRA-ARTICULAR; INTRALESIONAL; INTRAMUSCULAR; INTRAVENOUS; SOFT TISSUE EVERY 8 HOURS
Status: CANCELLED | OUTPATIENT
Start: 2019-01-01

## 2019-01-01 RX ORDER — PROMETHAZINE HYDROCHLORIDE 12.5 MG/1
12.5 SUPPOSITORY RECTAL EVERY 4 HOURS PRN
Status: CANCELLED | OUTPATIENT
Start: 2019-01-01

## 2019-01-01 RX ORDER — SODIUM CHLORIDE 9 MG/ML
125 INJECTION, SOLUTION INTRAVENOUS CONTINUOUS
Status: DISCONTINUED | OUTPATIENT
Start: 2019-01-01 | End: 2019-01-01

## 2019-01-01 RX ORDER — ONDANSETRON 4 MG/1
4 TABLET, ORALLY DISINTEGRATING ORAL EVERY 6 HOURS PRN
Status: DISCONTINUED | OUTPATIENT
Start: 2019-01-01 | End: 2019-01-01

## 2019-01-01 RX ORDER — HALOPERIDOL 5 MG/ML
1 INJECTION INTRAMUSCULAR EVERY 4 HOURS PRN
Status: CANCELLED | OUTPATIENT
Start: 2019-01-01

## 2019-01-01 RX ORDER — LORAZEPAM 2 MG/ML
0.5 INJECTION INTRAMUSCULAR
Status: CANCELLED | OUTPATIENT
Start: 2019-01-01 | End: 2019-03-25

## 2019-01-01 RX ORDER — PROMETHAZINE HYDROCHLORIDE 25 MG/ML
12.5 INJECTION, SOLUTION INTRAMUSCULAR; INTRAVENOUS EVERY 4 HOURS PRN
Status: DISCONTINUED | OUTPATIENT
Start: 2019-01-01 | End: 2019-01-01 | Stop reason: HOSPADM

## 2019-01-01 RX ORDER — MORPHINE SULFATE 10 MG/ML
6 INJECTION INTRAMUSCULAR; INTRAVENOUS; SUBCUTANEOUS
Status: DISCONTINUED | OUTPATIENT
Start: 2019-01-01 | End: 2019-01-01 | Stop reason: HOSPADM

## 2019-01-01 RX ORDER — PROMETHAZINE HYDROCHLORIDE 25 MG/1
12.5 TABLET ORAL EVERY 4 HOURS PRN
Status: CANCELLED | OUTPATIENT
Start: 2019-01-01

## 2019-01-01 RX ADMIN — DEXAMETHASONE SODIUM PHOSPHATE 4 MG: 4 INJECTION, SOLUTION INTRA-ARTICULAR; INTRALESIONAL; INTRAMUSCULAR; INTRAVENOUS; SOFT TISSUE at 06:01

## 2019-01-01 RX ADMIN — LORAZEPAM 2 MG: 2 INJECTION INTRAMUSCULAR; INTRAVENOUS at 20:06

## 2019-01-01 RX ADMIN — POTASSIUM CHLORIDE 40 MEQ: 750 CAPSULE, EXTENDED RELEASE ORAL at 08:22

## 2019-01-01 RX ADMIN — LORAZEPAM 2 MG: 2 INJECTION INTRAMUSCULAR; INTRAVENOUS at 12:53

## 2019-01-01 RX ADMIN — ONDANSETRON HYDROCHLORIDE 4 MG: 2 SOLUTION INTRAMUSCULAR; INTRAVENOUS at 16:30

## 2019-01-01 RX ADMIN — DEXAMETHASONE SODIUM PHOSPHATE 4 MG: 4 INJECTION, SOLUTION INTRA-ARTICULAR; INTRALESIONAL; INTRAMUSCULAR; INTRAVENOUS; SOFT TISSUE at 05:11

## 2019-01-01 RX ADMIN — LORAZEPAM 1 MG: 2 INJECTION INTRAMUSCULAR; INTRAVENOUS at 20:12

## 2019-01-01 RX ADMIN — INSULIN LISPRO 2 UNITS: 100 INJECTION, SOLUTION INTRAVENOUS; SUBCUTANEOUS at 00:00

## 2019-01-01 RX ADMIN — HYDROMORPHONE HYDROCHLORIDE 0.5 MG: 1 INJECTION, SOLUTION INTRAMUSCULAR; INTRAVENOUS; SUBCUTANEOUS at 13:24

## 2019-01-01 RX ADMIN — LEVETIRACETAM 1000 MG: 10 INJECTION INTRAVENOUS at 16:45

## 2019-01-01 RX ADMIN — LORAZEPAM 1 MG: 2 INJECTION INTRAMUSCULAR; INTRAVENOUS at 09:07

## 2019-01-01 RX ADMIN — LEVETIRACETAM 1000 MG: 10 INJECTION INTRAVENOUS at 20:49

## 2019-01-01 RX ADMIN — LEVETIRACETAM 1000 MG: 10 INJECTION INTRAVENOUS at 09:07

## 2019-01-01 RX ADMIN — HYDROMORPHONE HYDROCHLORIDE 0.5 MG: 1 INJECTION, SOLUTION INTRAMUSCULAR; INTRAVENOUS; SUBCUTANEOUS at 00:55

## 2019-01-01 RX ADMIN — FAMOTIDINE 20 MG: 10 INJECTION INTRAVENOUS at 15:21

## 2019-01-01 RX ADMIN — LORAZEPAM 1 MG: 2 INJECTION INTRAMUSCULAR; INTRAVENOUS at 08:40

## 2019-01-01 RX ADMIN — DEXAMETHASONE SODIUM PHOSPHATE 4 MG: 4 INJECTION, SOLUTION INTRA-ARTICULAR; INTRALESIONAL; INTRAMUSCULAR; INTRAVENOUS; SOFT TISSUE at 06:25

## 2019-01-01 RX ADMIN — DEXAMETHASONE SODIUM PHOSPHATE 4 MG: 4 INJECTION, SOLUTION INTRA-ARTICULAR; INTRALESIONAL; INTRAMUSCULAR; INTRAVENOUS; SOFT TISSUE at 07:32

## 2019-01-01 RX ADMIN — DEXAMETHASONE SODIUM PHOSPHATE 4 MG: 4 INJECTION, SOLUTION INTRA-ARTICULAR; INTRALESIONAL; INTRAMUSCULAR; INTRAVENOUS; SOFT TISSUE at 09:00

## 2019-01-01 RX ADMIN — LEVETIRACETAM 1000 MG: 10 INJECTION INTRAVENOUS at 08:09

## 2019-01-01 RX ADMIN — DEXAMETHASONE SODIUM PHOSPHATE 4 MG: 4 INJECTION, SOLUTION INTRA-ARTICULAR; INTRALESIONAL; INTRAMUSCULAR; INTRAVENOUS; SOFT TISSUE at 18:27

## 2019-01-01 RX ADMIN — HYDROMORPHONE HYDROCHLORIDE 1 MG: 1 INJECTION, SOLUTION INTRAMUSCULAR; INTRAVENOUS; SUBCUTANEOUS at 12:08

## 2019-01-01 RX ADMIN — HYDROMORPHONE HYDROCHLORIDE 1 MG: 1 INJECTION, SOLUTION INTRAMUSCULAR; INTRAVENOUS; SUBCUTANEOUS at 08:53

## 2019-01-01 RX ADMIN — LORAZEPAM 1 MG: 2 INJECTION INTRAMUSCULAR; INTRAVENOUS at 02:33

## 2019-01-01 RX ADMIN — HYDROMORPHONE HYDROCHLORIDE 1 MG: 1 INJECTION, SOLUTION INTRAMUSCULAR; INTRAVENOUS; SUBCUTANEOUS at 12:53

## 2019-01-01 RX ADMIN — LORAZEPAM 1 MG: 2 INJECTION INTRAMUSCULAR; INTRAVENOUS at 00:56

## 2019-01-01 RX ADMIN — LEVETIRACETAM 1000 MG: 10 INJECTION INTRAVENOUS at 08:59

## 2019-01-01 RX ADMIN — LEVETIRACETAM 1000 MG: 10 INJECTION INTRAVENOUS at 20:51

## 2019-01-01 RX ADMIN — LEVETIRACETAM 1000 MG: 10 INJECTION INTRAVENOUS at 12:27

## 2019-01-01 RX ADMIN — DEXAMETHASONE SODIUM PHOSPHATE 4 MG: 4 INJECTION, SOLUTION INTRA-ARTICULAR; INTRALESIONAL; INTRAMUSCULAR; INTRAVENOUS; SOFT TISSUE at 01:37

## 2019-01-01 RX ADMIN — LORAZEPAM 1 MG: 2 INJECTION INTRAMUSCULAR; INTRAVENOUS at 04:29

## 2019-01-01 RX ADMIN — HYDROMORPHONE HYDROCHLORIDE 1 MG: 1 INJECTION, SOLUTION INTRAMUSCULAR; INTRAVENOUS; SUBCUTANEOUS at 04:05

## 2019-01-01 RX ADMIN — DEXAMETHASONE SODIUM PHOSPHATE 4 MG: 4 INJECTION, SOLUTION INTRA-ARTICULAR; INTRALESIONAL; INTRAMUSCULAR; INTRAVENOUS; SOFT TISSUE at 17:45

## 2019-01-01 RX ADMIN — LORAZEPAM 1 MG: 2 INJECTION INTRAMUSCULAR; INTRAVENOUS at 20:49

## 2019-01-01 RX ADMIN — DEXAMETHASONE SODIUM PHOSPHATE 4 MG: 4 INJECTION, SOLUTION INTRA-ARTICULAR; INTRALESIONAL; INTRAMUSCULAR; INTRAVENOUS; SOFT TISSUE at 05:38

## 2019-01-01 RX ADMIN — HYDROMORPHONE HYDROCHLORIDE 0.5 MG: 1 INJECTION, SOLUTION INTRAMUSCULAR; INTRAVENOUS; SUBCUTANEOUS at 16:30

## 2019-01-01 RX ADMIN — DEXAMETHASONE SODIUM PHOSPHATE 4 MG: 4 INJECTION, SOLUTION INTRA-ARTICULAR; INTRALESIONAL; INTRAMUSCULAR; INTRAVENOUS; SOFT TISSUE at 09:17

## 2019-01-01 RX ADMIN — DEXAMETHASONE SODIUM PHOSPHATE 4 MG: 4 INJECTION, SOLUTION INTRA-ARTICULAR; INTRALESIONAL; INTRAMUSCULAR; INTRAVENOUS; SOFT TISSUE at 02:00

## 2019-01-01 RX ADMIN — HYDROMORPHONE HYDROCHLORIDE 1 MG: 1 INJECTION, SOLUTION INTRAMUSCULAR; INTRAVENOUS; SUBCUTANEOUS at 20:44

## 2019-01-01 RX ADMIN — LEVETIRACETAM 1000 MG: 10 INJECTION INTRAVENOUS at 08:07

## 2019-01-01 RX ADMIN — LORAZEPAM 2 MG: 2 INJECTION INTRAMUSCULAR; INTRAVENOUS at 00:01

## 2019-01-01 RX ADMIN — DEXAMETHASONE SODIUM PHOSPHATE 4 MG: 4 INJECTION, SOLUTION INTRA-ARTICULAR; INTRALESIONAL; INTRAMUSCULAR; INTRAVENOUS; SOFT TISSUE at 02:59

## 2019-01-01 RX ADMIN — INSULIN LISPRO 4 UNITS: 100 INJECTION, SOLUTION INTRAVENOUS; SUBCUTANEOUS at 12:07

## 2019-01-01 RX ADMIN — HYDROMORPHONE HYDROCHLORIDE 1 MG: 1 INJECTION, SOLUTION INTRAMUSCULAR; INTRAVENOUS; SUBCUTANEOUS at 17:36

## 2019-01-01 RX ADMIN — HYDROMORPHONE HYDROCHLORIDE 0.5 MG: 1 INJECTION, SOLUTION INTRAMUSCULAR; INTRAVENOUS; SUBCUTANEOUS at 00:33

## 2019-01-01 RX ADMIN — LORAZEPAM 1 MG: 2 INJECTION INTRAMUSCULAR; INTRAVENOUS at 17:00

## 2019-01-01 RX ADMIN — LORAZEPAM 1 MG: 2 INJECTION INTRAMUSCULAR; INTRAVENOUS at 09:10

## 2019-01-01 RX ADMIN — GADOBENATE DIMEGLUMINE 10 ML: 529 INJECTION, SOLUTION INTRAVENOUS at 14:35

## 2019-01-01 RX ADMIN — HYDROMORPHONE HYDROCHLORIDE 1 MG: 1 INJECTION, SOLUTION INTRAMUSCULAR; INTRAVENOUS; SUBCUTANEOUS at 04:29

## 2019-01-01 RX ADMIN — DEXAMETHASONE SODIUM PHOSPHATE 4 MG: 4 INJECTION, SOLUTION INTRA-ARTICULAR; INTRALESIONAL; INTRAMUSCULAR; INTRAVENOUS; SOFT TISSUE at 16:30

## 2019-01-01 RX ADMIN — DEXAMETHASONE SODIUM PHOSPHATE 4 MG: 4 INJECTION, SOLUTION INTRA-ARTICULAR; INTRALESIONAL; INTRAMUSCULAR; INTRAVENOUS; SOFT TISSUE at 17:05

## 2019-01-01 RX ADMIN — LEVETIRACETAM 1000 MG: 10 INJECTION INTRAVENOUS at 09:23

## 2019-01-01 RX ADMIN — HYDROMORPHONE HYDROCHLORIDE 1 MG: 1 INJECTION, SOLUTION INTRAMUSCULAR; INTRAVENOUS; SUBCUTANEOUS at 01:27

## 2019-01-01 RX ADMIN — LORAZEPAM 1 MG: 2 INJECTION INTRAMUSCULAR; INTRAVENOUS at 18:04

## 2019-01-01 RX ADMIN — HYDROMORPHONE HYDROCHLORIDE 1 MG: 1 INJECTION, SOLUTION INTRAMUSCULAR; INTRAVENOUS; SUBCUTANEOUS at 08:07

## 2019-01-01 RX ADMIN — HYDROMORPHONE HYDROCHLORIDE 1 MG: 1 INJECTION, SOLUTION INTRAMUSCULAR; INTRAVENOUS; SUBCUTANEOUS at 09:10

## 2019-01-01 RX ADMIN — LEVETIRACETAM 1000 MG: 10 INJECTION INTRAVENOUS at 21:15

## 2019-01-01 RX ADMIN — HYDROMORPHONE HYDROCHLORIDE 1 MG: 1 INJECTION, SOLUTION INTRAMUSCULAR; INTRAVENOUS; SUBCUTANEOUS at 16:07

## 2019-01-01 RX ADMIN — HYDROMORPHONE HYDROCHLORIDE 0.5 MG: 1 INJECTION, SOLUTION INTRAMUSCULAR; INTRAVENOUS; SUBCUTANEOUS at 14:13

## 2019-01-01 RX ADMIN — LORAZEPAM 1 MG: 2 INJECTION INTRAMUSCULAR; INTRAVENOUS at 09:15

## 2019-01-01 RX ADMIN — LEVETIRACETAM 1000 MG: 10 INJECTION INTRAVENOUS at 21:00

## 2019-01-01 RX ADMIN — LORAZEPAM 2 MG: 2 INJECTION INTRAMUSCULAR; INTRAVENOUS at 08:07

## 2019-01-01 RX ADMIN — LORAZEPAM 1 MG: 2 INJECTION INTRAMUSCULAR; INTRAVENOUS at 09:24

## 2019-01-01 RX ADMIN — LEVETIRACETAM 1000 MG: 10 INJECTION INTRAVENOUS at 13:43

## 2019-01-01 RX ADMIN — LORAZEPAM 1 MG: 2 INJECTION INTRAMUSCULAR; INTRAVENOUS at 13:53

## 2019-01-01 RX ADMIN — LEVETIRACETAM 1000 MG: 10 INJECTION INTRAVENOUS at 08:22

## 2019-01-01 RX ADMIN — ONDANSETRON HYDROCHLORIDE 4 MG: 2 SOLUTION INTRAMUSCULAR; INTRAVENOUS at 08:30

## 2019-01-01 RX ADMIN — LORAZEPAM 1 MG: 2 INJECTION INTRAMUSCULAR; INTRAVENOUS at 17:05

## 2019-01-01 RX ADMIN — HYDROMORPHONE HYDROCHLORIDE 0.5 MG: 1 INJECTION, SOLUTION INTRAMUSCULAR; INTRAVENOUS; SUBCUTANEOUS at 03:26

## 2019-01-01 RX ADMIN — LEVETIRACETAM 1000 MG: 10 INJECTION INTRAVENOUS at 20:31

## 2019-01-01 RX ADMIN — HYDROMORPHONE HYDROCHLORIDE 1 MG: 1 INJECTION, SOLUTION INTRAMUSCULAR; INTRAVENOUS; SUBCUTANEOUS at 05:11

## 2019-01-01 RX ADMIN — HYDROMORPHONE HYDROCHLORIDE 1 MG: 1 INJECTION, SOLUTION INTRAMUSCULAR; INTRAVENOUS; SUBCUTANEOUS at 20:41

## 2019-01-01 RX ADMIN — LEVETIRACETAM 1000 MG: 10 INJECTION INTRAVENOUS at 08:40

## 2019-01-01 RX ADMIN — LEVETIRACETAM 1000 MG: 10 INJECTION INTRAVENOUS at 20:10

## 2019-01-01 RX ADMIN — HYDROMORPHONE HYDROCHLORIDE 1 MG: 1 INJECTION, SOLUTION INTRAMUSCULAR; INTRAVENOUS; SUBCUTANEOUS at 12:43

## 2019-01-01 RX ADMIN — DEXAMETHASONE SODIUM PHOSPHATE 4 MG: 4 INJECTION, SOLUTION INTRAMUSCULAR; INTRAVENOUS at 03:06

## 2019-01-01 RX ADMIN — HYDROMORPHONE HYDROCHLORIDE 1 MG: 1 INJECTION, SOLUTION INTRAMUSCULAR; INTRAVENOUS; SUBCUTANEOUS at 00:02

## 2019-01-01 RX ADMIN — DEXAMETHASONE SODIUM PHOSPHATE 4 MG: 4 INJECTION, SOLUTION INTRA-ARTICULAR; INTRALESIONAL; INTRAMUSCULAR; INTRAVENOUS; SOFT TISSUE at 02:45

## 2019-01-01 RX ADMIN — DEXTROSE AND SODIUM CHLORIDE 50 ML/HR: 5; .45 INJECTION, SOLUTION INTRAVENOUS at 11:09

## 2019-01-01 RX ADMIN — LEVETIRACETAM 1000 MG: 10 INJECTION INTRAVENOUS at 20:06

## 2019-01-01 RX ADMIN — HYDROMORPHONE HYDROCHLORIDE 1 MG: 1 INJECTION, SOLUTION INTRAMUSCULAR; INTRAVENOUS; SUBCUTANEOUS at 10:41

## 2019-01-01 RX ADMIN — DEXAMETHASONE SODIUM PHOSPHATE 4 MG: 4 INJECTION, SOLUTION INTRA-ARTICULAR; INTRALESIONAL; INTRAMUSCULAR; INTRAVENOUS; SOFT TISSUE at 17:59

## 2019-01-01 RX ADMIN — HYDROMORPHONE HYDROCHLORIDE 1 MG: 1 INJECTION, SOLUTION INTRAMUSCULAR; INTRAVENOUS; SUBCUTANEOUS at 15:40

## 2019-01-01 RX ADMIN — LEVETIRACETAM 1000 MG: 10 INJECTION INTRAVENOUS at 08:16

## 2019-01-01 RX ADMIN — LORAZEPAM 1 MG: 2 INJECTION INTRAMUSCULAR; INTRAVENOUS at 04:51

## 2019-01-01 RX ADMIN — HYDROMORPHONE HYDROCHLORIDE 1 MG: 1 INJECTION, SOLUTION INTRAMUSCULAR; INTRAVENOUS; SUBCUTANEOUS at 01:01

## 2019-01-01 RX ADMIN — HYDROMORPHONE HYDROCHLORIDE 0.5 MG: 1 INJECTION, SOLUTION INTRAMUSCULAR; INTRAVENOUS; SUBCUTANEOUS at 18:05

## 2019-01-01 RX ADMIN — HYDROMORPHONE HYDROCHLORIDE 0.5 MG: 1 INJECTION, SOLUTION INTRAMUSCULAR; INTRAVENOUS; SUBCUTANEOUS at 02:33

## 2019-01-01 RX ADMIN — GLYCOPYRROLATE 0.2 MG: 0.2 INJECTION, SOLUTION INTRAMUSCULAR; INTRAVENOUS at 10:41

## 2019-01-01 RX ADMIN — HYDROMORPHONE HYDROCHLORIDE 1 MG: 1 INJECTION, SOLUTION INTRAMUSCULAR; INTRAVENOUS; SUBCUTANEOUS at 20:49

## 2019-01-01 RX ADMIN — LORAZEPAM 2 MG: 2 INJECTION INTRAMUSCULAR; INTRAVENOUS at 18:44

## 2019-01-01 RX ADMIN — DEXAMETHASONE SODIUM PHOSPHATE 4 MG: 4 INJECTION, SOLUTION INTRA-ARTICULAR; INTRALESIONAL; INTRAMUSCULAR; INTRAVENOUS; SOFT TISSUE at 18:24

## 2019-01-01 RX ADMIN — SCOPALAMINE 1 PATCH: 1 PATCH, EXTENDED RELEASE TRANSDERMAL at 10:41

## 2019-01-01 RX ADMIN — LORAZEPAM 1 MG: 2 INJECTION INTRAMUSCULAR; INTRAVENOUS at 12:56

## 2019-01-01 RX ADMIN — DEXAMETHASONE SODIUM PHOSPHATE 4 MG: 4 INJECTION, SOLUTION INTRA-ARTICULAR; INTRALESIONAL; INTRAMUSCULAR; INTRAVENOUS; SOFT TISSUE at 17:49

## 2019-01-01 RX ADMIN — HYDROMORPHONE HYDROCHLORIDE 0.5 MG: 1 INJECTION, SOLUTION INTRAMUSCULAR; INTRAVENOUS; SUBCUTANEOUS at 21:08

## 2019-01-01 RX ADMIN — DEXAMETHASONE SODIUM PHOSPHATE 4 MG: 4 INJECTION, SOLUTION INTRA-ARTICULAR; INTRALESIONAL; INTRAMUSCULAR; INTRAVENOUS; SOFT TISSUE at 02:05

## 2019-01-01 RX ADMIN — LORAZEPAM 1 MG: 2 INJECTION INTRAMUSCULAR; INTRAVENOUS at 03:25

## 2019-01-01 RX ADMIN — DEXAMETHASONE SODIUM PHOSPHATE 4 MG: 4 INJECTION, SOLUTION INTRA-ARTICULAR; INTRALESIONAL; INTRAMUSCULAR; INTRAVENOUS; SOFT TISSUE at 02:30

## 2019-01-01 RX ADMIN — HYDROMORPHONE HYDROCHLORIDE 1 MG: 1 INJECTION, SOLUTION INTRAMUSCULAR; INTRAVENOUS; SUBCUTANEOUS at 12:56

## 2019-01-01 RX ADMIN — HYDROMORPHONE HYDROCHLORIDE 1 MG: 1 INJECTION, SOLUTION INTRAMUSCULAR; INTRAVENOUS; SUBCUTANEOUS at 04:52

## 2019-01-01 RX ADMIN — LORAZEPAM 2 MG: 2 INJECTION INTRAMUSCULAR; INTRAVENOUS at 16:07

## 2019-01-01 RX ADMIN — DEXAMETHASONE SODIUM PHOSPHATE 4 MG: 4 INJECTION, SOLUTION INTRA-ARTICULAR; INTRALESIONAL; INTRAMUSCULAR; INTRAVENOUS; SOFT TISSUE at 17:43

## 2019-01-01 RX ADMIN — LEVETIRACETAM 1000 MG: 10 INJECTION INTRAVENOUS at 20:48

## 2019-01-01 RX ADMIN — DEXAMETHASONE SODIUM PHOSPHATE 4 MG: 4 INJECTION, SOLUTION INTRAMUSCULAR; INTRAVENOUS at 09:25

## 2019-01-01 RX ADMIN — LORAZEPAM 2 MG: 2 INJECTION INTRAMUSCULAR; INTRAVENOUS at 04:04

## 2019-01-01 RX ADMIN — DEXAMETHASONE SODIUM PHOSPHATE 4 MG: 4 INJECTION, SOLUTION INTRA-ARTICULAR; INTRALESIONAL; INTRAMUSCULAR; INTRAVENOUS; SOFT TISSUE at 17:37

## 2019-01-01 RX ADMIN — HYDROMORPHONE HYDROCHLORIDE 1 MG: 1 INJECTION, SOLUTION INTRAMUSCULAR; INTRAVENOUS; SUBCUTANEOUS at 00:26

## 2019-01-01 RX ADMIN — Medication 1 DROP: at 17:07

## 2019-01-01 RX ADMIN — LEVETIRACETAM 1000 MG: 10 INJECTION INTRAVENOUS at 08:41

## 2019-01-01 RX ADMIN — HYDROMORPHONE HYDROCHLORIDE 1 MG: 1 INJECTION, SOLUTION INTRAMUSCULAR; INTRAVENOUS; SUBCUTANEOUS at 13:52

## 2019-01-01 RX ADMIN — LEVETIRACETAM 1000 MG: 10 INJECTION INTRAVENOUS at 20:44

## 2019-01-01 RX ADMIN — HYDROMORPHONE HYDROCHLORIDE 0.5 MG: 1 INJECTION, SOLUTION INTRAMUSCULAR; INTRAVENOUS; SUBCUTANEOUS at 20:13

## 2019-01-01 RX ADMIN — DEXAMETHASONE SODIUM PHOSPHATE 4 MG: 4 INJECTION, SOLUTION INTRA-ARTICULAR; INTRALESIONAL; INTRAMUSCULAR; INTRAVENOUS; SOFT TISSUE at 09:08

## 2019-01-01 RX ADMIN — DEXAMETHASONE SODIUM PHOSPHATE 4 MG: 4 INJECTION, SOLUTION INTRA-ARTICULAR; INTRALESIONAL; INTRAMUSCULAR; INTRAVENOUS; SOFT TISSUE at 17:18

## 2019-01-01 RX ADMIN — DEXAMETHASONE SODIUM PHOSPHATE 4 MG: 4 INJECTION, SOLUTION INTRA-ARTICULAR; INTRALESIONAL; INTRAMUSCULAR; INTRAVENOUS; SOFT TISSUE at 18:11

## 2019-01-01 RX ADMIN — DEXAMETHASONE SODIUM PHOSPHATE 4 MG: 4 INJECTION, SOLUTION INTRA-ARTICULAR; INTRALESIONAL; INTRAMUSCULAR; INTRAVENOUS; SOFT TISSUE at 01:57

## 2019-01-01 RX ADMIN — LORAZEPAM 1 MG: 2 INJECTION INTRAMUSCULAR; INTRAVENOUS at 01:27

## 2019-01-01 RX ADMIN — LEVETIRACETAM 1000 MG: 10 INJECTION INTRAVENOUS at 20:41

## 2019-01-01 RX ADMIN — LORAZEPAM 1 MG: 2 INJECTION INTRAMUSCULAR; INTRAVENOUS at 12:43

## 2019-01-01 RX ADMIN — DEXAMETHASONE SODIUM PHOSPHATE 4 MG: 4 INJECTION, SOLUTION INTRA-ARTICULAR; INTRALESIONAL; INTRAMUSCULAR; INTRAVENOUS; SOFT TISSUE at 09:12

## 2019-01-01 RX ADMIN — DEXAMETHASONE SODIUM PHOSPHATE 4 MG: 4 INJECTION, SOLUTION INTRA-ARTICULAR; INTRALESIONAL; INTRAMUSCULAR; INTRAVENOUS; SOFT TISSUE at 10:55

## 2019-01-01 RX ADMIN — SODIUM CHLORIDE, PRESERVATIVE FREE 10 ML: 5 INJECTION INTRAVENOUS at 08:23

## 2019-01-01 RX ADMIN — HYDROMORPHONE HYDROCHLORIDE 1 MG: 1 INJECTION, SOLUTION INTRAMUSCULAR; INTRAVENOUS; SUBCUTANEOUS at 17:01

## 2019-01-01 RX ADMIN — LORAZEPAM 1 MG: 2 INJECTION INTRAMUSCULAR; INTRAVENOUS at 00:33

## 2019-01-01 RX ADMIN — DEXAMETHASONE SODIUM PHOSPHATE 4 MG: 4 INJECTION, SOLUTION INTRAMUSCULAR; INTRAVENOUS at 18:06

## 2019-01-01 RX ADMIN — LORAZEPAM 2 MG: 2 INJECTION INTRAMUSCULAR; INTRAVENOUS at 04:05

## 2019-01-01 RX ADMIN — LEVETIRACETAM 1000 MG: 10 INJECTION INTRAVENOUS at 21:44

## 2019-01-01 RX ADMIN — LEVETIRACETAM 1000 MG: 10 INJECTION INTRAVENOUS at 09:00

## 2019-01-01 RX ADMIN — SODIUM CHLORIDE 125 ML/HR: 9 INJECTION, SOLUTION INTRAVENOUS at 18:40

## 2019-01-01 RX ADMIN — LORAZEPAM 1 MG: 2 INJECTION INTRAMUSCULAR; INTRAVENOUS at 21:08

## 2019-01-01 RX ADMIN — CEFTRIAXONE SODIUM 1 G: 1 INJECTION, SOLUTION INTRAVENOUS at 19:31

## 2019-01-01 RX ADMIN — HYDROMORPHONE HYDROCHLORIDE 0.5 MG: 1 INJECTION, SOLUTION INTRAMUSCULAR; INTRAVENOUS; SUBCUTANEOUS at 09:14

## 2019-01-01 RX ADMIN — LORAZEPAM 1 MG: 2 INJECTION INTRAMUSCULAR; INTRAVENOUS at 17:36

## 2019-01-01 RX ADMIN — LORAZEPAM 2 MG: 2 INJECTION INTRAMUSCULAR; INTRAVENOUS at 20:44

## 2019-01-01 RX ADMIN — LORAZEPAM 1 MG: 2 INJECTION INTRAMUSCULAR; INTRAVENOUS at 17:44

## 2019-01-01 RX ADMIN — INSULIN LISPRO 3 UNITS: 100 INJECTION, SOLUTION INTRAVENOUS; SUBCUTANEOUS at 00:45

## 2019-01-01 RX ADMIN — LORAZEPAM 2 MG: 2 INJECTION INTRAMUSCULAR; INTRAVENOUS at 08:16

## 2019-01-01 RX ADMIN — LORAZEPAM 2 MG: 2 INJECTION INTRAMUSCULAR; INTRAVENOUS at 15:40

## 2019-01-01 RX ADMIN — HYDROMORPHONE HYDROCHLORIDE 0.5 MG: 1 INJECTION, SOLUTION INTRAMUSCULAR; INTRAVENOUS; SUBCUTANEOUS at 04:29

## 2019-01-01 RX ADMIN — LORAZEPAM 2 MG: 2 INJECTION INTRAMUSCULAR; INTRAVENOUS at 10:41

## 2019-01-01 RX ADMIN — LORAZEPAM 1 MG: 2 INJECTION INTRAMUSCULAR; INTRAVENOUS at 00:26

## 2019-01-01 RX ADMIN — LORAZEPAM 1 MG: 2 INJECTION INTRAMUSCULAR; INTRAVENOUS at 13:24

## 2019-01-01 RX ADMIN — HYDROMORPHONE HYDROCHLORIDE 1 MG: 1 INJECTION, SOLUTION INTRAMUSCULAR; INTRAVENOUS; SUBCUTANEOUS at 08:41

## 2019-01-01 RX ADMIN — HYDROMORPHONE HYDROCHLORIDE 1 MG: 1 INJECTION, SOLUTION INTRAMUSCULAR; INTRAVENOUS; SUBCUTANEOUS at 18:43

## 2019-01-01 RX ADMIN — LORAZEPAM 1 MG: 2 INJECTION INTRAMUSCULAR; INTRAVENOUS at 05:11

## 2019-01-01 RX ADMIN — LORAZEPAM 2 MG: 2 INJECTION INTRAMUSCULAR; INTRAVENOUS at 12:08

## 2019-01-01 RX ADMIN — LORAZEPAM 1 MG: 2 INJECTION INTRAMUSCULAR; INTRAVENOUS at 01:01

## 2019-01-01 RX ADMIN — HYDROMORPHONE HYDROCHLORIDE 0.5 MG: 1 INJECTION, SOLUTION INTRAMUSCULAR; INTRAVENOUS; SUBCUTANEOUS at 09:07

## 2019-01-01 RX ADMIN — HYDROMORPHONE HYDROCHLORIDE 1 MG: 1 INJECTION, SOLUTION INTRAMUSCULAR; INTRAVENOUS; SUBCUTANEOUS at 20:06

## 2019-01-01 RX ADMIN — HYDROMORPHONE HYDROCHLORIDE 1 MG: 1 INJECTION, SOLUTION INTRAMUSCULAR; INTRAVENOUS; SUBCUTANEOUS at 08:16

## 2019-01-01 RX ADMIN — LEVETIRACETAM 1000 MG: 10 INJECTION INTRAVENOUS at 08:26

## 2019-01-01 RX ADMIN — FAMOTIDINE 20 MG: 10 INJECTION INTRAVENOUS at 08:40

## 2019-01-01 RX ADMIN — HYDROMORPHONE HYDROCHLORIDE 1 MG: 1 INJECTION, SOLUTION INTRAMUSCULAR; INTRAVENOUS; SUBCUTANEOUS at 09:24

## 2019-01-01 RX ADMIN — LORAZEPAM 1 MG: 2 INJECTION INTRAMUSCULAR; INTRAVENOUS at 08:53

## 2019-01-01 RX ADMIN — DEXAMETHASONE SODIUM PHOSPHATE 4 MG: 4 INJECTION, SOLUTION INTRA-ARTICULAR; INTRALESIONAL; INTRAMUSCULAR; INTRAVENOUS; SOFT TISSUE at 09:56

## 2019-01-01 RX ADMIN — DEXAMETHASONE SODIUM PHOSPHATE 4 MG: 4 INJECTION, SOLUTION INTRA-ARTICULAR; INTRALESIONAL; INTRAMUSCULAR; INTRAVENOUS; SOFT TISSUE at 18:43

## 2019-01-01 RX ADMIN — HYDROMORPHONE HYDROCHLORIDE 1 MG: 1 INJECTION, SOLUTION INTRAMUSCULAR; INTRAVENOUS; SUBCUTANEOUS at 17:06

## 2019-01-01 RX ADMIN — IOPAMIDOL 85 ML: 612 INJECTION, SOLUTION INTRAVENOUS at 09:30

## 2019-01-01 RX ADMIN — HYDROMORPHONE HYDROCHLORIDE 1 MG: 1 INJECTION, SOLUTION INTRAMUSCULAR; INTRAVENOUS; SUBCUTANEOUS at 04:04

## 2019-01-01 RX ADMIN — HYDROMORPHONE HYDROCHLORIDE 0.5 MG: 1 INJECTION, SOLUTION INTRAMUSCULAR; INTRAVENOUS; SUBCUTANEOUS at 17:44

## 2019-03-11 PROBLEM — R41.82 ALTERED MENTAL STATUS: Status: ACTIVE | Noted: 2019-01-01

## 2019-03-11 NOTE — CONSULTS
"Patient name: Jamel Rojas  Referring Provider: Dr Edwards   Reason for Consultation: Confusion/abnormal head CT    Patient Care Team:  Mary Jo Wells MD as PCP - General (Family Medicine)    Chief complaint: confusion    Subjective .     History of present illness:    Patient is a 63 y.o. right handed male who was brought in to the emergency room by EMS after being found at his home by family members.  He had been lost contact since Christmas.  The patient was disheveled and unable to talk or provide any history and apparently was living in [place] incontinent of bowel and bladder.  He is unable to provide any additional information/HPI secondary to expressive aphasia.  He is following all commands and intermittently is able to answer yes or no to questions.    Hospital records reveal history of colon resection for history of rectal cancer by Dr. Arreola.  Also has history of alcohol abuse.  Hospital records also reveal hepatitis C and B.  No family members at bedside.  CT head obtained on arrival revealed a very large left frontal mass.  MRI ordered and pending.  He denies any pain, numbness, tingling or weakness of extremities or headache by shaking had \"no.\"      /80   Pulse 89   Temp 97.6 °F (36.4 °C) (Tympanic)   Resp 24   Wt 49.1 kg (108 lb 4.8 oz)   SpO2 98%   BMI 16.47 kg/m²       Review of Systems  Review of Systems   Unable to perform ROS: Patient nonverbal   Unable to obtain secondary to expressive aphasia.      History  PAST MEDICAL HISTORY  Past Medical History:   Diagnosis Date   • Hepatitis C     TREATED WITH HARVONY   • History of hepatitis B    • Kidney stones    • Rectal cancer (CMS/HCC) 2013       PAST SURGICAL HISTORY  Past Surgical History:   Procedure Laterality Date   • APPENDECTOMY  04/09/2013    Low Anterior Resection with Colorectal anastomisis, diverting loop ileostomy and appendectomy-Dr. Ben Arreola   • COLONOSCOPY N/A 5/23/2018    Procedure: COLONOSCOPY TO " CECUM;  Surgeon: Ben Arreola MD;  Location: [facility] ENDOSCOPY;  Service: Gastroenterology   • FLEXIBLE SIGMOIDOSCOPY  2013    Dr. Ben Arreola   • FLEXIBLE SIGMOIDOSCOPY  2013    Dr. Ben Arreola   • LOW ANTERIOR BOWEL RESECTION  2013    Low Anterior Resection with Colorectal anastomisis, diverting loop ileostomy and appendectomy-Dr. Ben Arreola   • MEDIPORT INSERTION, SINGLE Left 2013    Dr. Ben Arreola   • MEDIPORT REMOVAL  2013   • RESECTION SMALL BOWEL / CLOSURE ILEOSTOMY Left 2013    Dr. Ben Arreola       FAMILY HISTORY  Family History   Problem Relation Age of Onset   • Malig Hyperthermia Neg Hx        SOCIAL HISTORY  Social History     Tobacco Use   • Smoking status: Former Smoker     Last attempt to quit: 2013     Years since quittin.1   • Smokeless tobacco: Never Used   Substance Use Topics   • Alcohol use: No   • Drug use: No       Allergies:  Hydrocodone    MEDICATIONS:    (Not in a hospital admission)    Objective     Results Review:  LABS:    Admission on 2019   Component Date Value Ref Range Status   • Glucose 2019 88  70 - 130 mg/dL Final   • Glucose 2019 89  65 - 99 mg/dL Final   • BUN 2019 28* 8 - 23 mg/dL Final   • Creatinine 2019 1.18  0.76 - 1.27 mg/dL Final   • Sodium 2019 148* 136 - 145 mmol/L Final   • Potassium 2019 4.1  3.5 - 5.2 mmol/L Final   • Chloride 2019 103  98 - 107 mmol/L Final   • CO2 2019 21.5* 22.0 - 29.0 mmol/L Final   • Calcium 2019 10.6* 8.6 - 10.5 mg/dL Final   • Total Protein 2019 7.3  6.0 - 8.5 g/dL Final   • Albumin 2019 4.60  3.50 - 5.20 g/dL Final   • ALT (SGPT) 2019 20  1 - 41 U/L Final   • AST (SGOT) 2019 23  1 - 40 U/L Final   • Alkaline Phosphatase 2019 91  39 - 117 U/L Final   • Total Bilirubin 2019 2.6* 0.1 - 1.2 mg/dL Final   • eGFR Non African Amer 2019 63  >60 mL/min/1.73 Final   • Globulin 2019  2.7  gm/dL Final   • A/G Ratio 03/11/2019 1.7  g/dL Final   • BUN/Creatinine Ratio 03/11/2019 23.7  7.0 - 25.0 Final   • Anion Gap 03/11/2019 23.5  mmol/L Final   • Color, UA 03/11/2019 Yellow  Yellow, Straw Final   • Appearance, UA 03/11/2019 Cloudy* Clear Final   • pH, UA 03/11/2019 5.5  5.0 - 8.0 Final   • Specific Gravity, UA 03/11/2019 1.023  1.005 - 1.030 Final   • Glucose, UA 03/11/2019 Negative  Negative Final   • Ketones, UA 03/11/2019 40 mg/dL (2+)* Negative Final   • Bilirubin, UA 03/11/2019 Negative  Negative Final   • Blood, UA 03/11/2019 Large (3+)* Negative Final   • Protein, UA 03/11/2019 Trace* Negative Final   • Leuk Esterase, UA 03/11/2019 Small (1+)* Negative Final   • Nitrite, UA 03/11/2019 Negative  Negative Final   • Urobilinogen, UA 03/11/2019 1.0 E.U./dL  0.2 - 1.0 E.U./dL Final   • Ethanol 03/11/2019 <10  0 - 10 mg/dL Final   • Ethanol % 03/11/2019 <0.010  % Final   • Amphet/Methamphet, Screen 03/11/2019 Negative  Negative Final   • Barbiturates Screen, Urine 03/11/2019 Negative  Negative Final   • Benzodiazepine Screen, Urine 03/11/2019 Negative  Negative Final   • Cocaine Screen, Urine 03/11/2019 Negative  Negative Final   • Opiate Screen 03/11/2019 Negative  Negative Final   • THC, Screen, Urine 03/11/2019 Positive* Negative Final   • Methadone Screen, Urine 03/11/2019 Negative  Negative Final   • Oxycodone Screen, Urine 03/11/2019 Negative  Negative Final   • Creatine Kinase 03/11/2019 201* 20 - 200 U/L Final   • Acetaminophen 03/11/2019 <5.0* 10.0 - 30.0 mcg/mL Final   • Salicylate 03/11/2019 <0.3  <=30.0 mg/dL Final   • WBC 03/11/2019 10.41  3.40 - 10.80 10*3/mm3 Final   • RBC 03/11/2019 5.70  4.14 - 5.80 10*6/mm3 Final   • Hemoglobin 03/11/2019 18.0* 13.0 - 17.7 g/dL Final   • Hematocrit 03/11/2019 53.6* 37.5 - 51.0 % Final   • MCV 03/11/2019 94.0  79.0 - 97.0 fL Final   • MCH 03/11/2019 31.6  26.6 - 33.0 pg Final   • MCHC 03/11/2019 33.6  31.5 - 35.7 g/dL Final   • RDW 03/11/2019  13.1  12.3 - 15.4 % Final   • RDW-SD 03/11/2019 45.2  37.0 - 54.0 fl Final   • MPV 03/11/2019 9.5  6.0 - 12.0 fL Final   • Platelets 03/11/2019 244  140 - 450 10*3/mm3 Final   • Neutrophil % 03/11/2019 87.9* 42.7 - 76.0 % Final   • Lymphocyte % 03/11/2019 5.9* 19.6 - 45.3 % Final   • Monocyte % 03/11/2019 5.7  5.0 - 12.0 % Final   • Eosinophil % 03/11/2019 0.0* 0.3 - 6.2 % Final   • Basophil % 03/11/2019 0.2  0.0 - 1.5 % Final   • Immature Grans % 03/11/2019 0.3  0.0 - 0.5 % Final   • Neutrophils, Absolute 03/11/2019 9.16* 1.40 - 7.00 10*3/mm3 Final   • Lymphocytes, Absolute 03/11/2019 0.61* 0.70 - 3.10 10*3/mm3 Final   • Monocytes, Absolute 03/11/2019 0.59  0.10 - 0.90 10*3/mm3 Final   • Eosinophils, Absolute 03/11/2019 0.00  0.00 - 0.40 10*3/mm3 Final   • Basophils, Absolute 03/11/2019 0.02  0.00 - 0.20 10*3/mm3 Final   • Immature Grans, Absolute 03/11/2019 0.03  0.00 - 0.05 10*3/mm3 Final   • nRBC 03/11/2019 0.0  0.0 - 0.0 /100 WBC Final   • RBC, UA 03/11/2019 Too Numerous to Count* None Seen, 0-2 /HPF Final   • WBC, UA 03/11/2019 6-12* None Seen, 0-2 /HPF Final   • Bacteria, UA 03/11/2019 1+* None Seen /HPF Final   • Squamous Epithelial Cells, UA 03/11/2019 None Seen  None Seen, 0-2 /HPF Final   • Hyaline Casts, UA 03/11/2019 7-12  None Seen /LPF Final   • Methodology 03/11/2019 Manual Light Microscopy   Final       DIAGNOSTICS:  [facility] dated March 11, 2019 reveals a very large left frontal lobe lesion measuring 6 x 5.7 cm      Results Review:   I reviewed the patient's new clinical results.  I personally viewed and interpreted the patient's CT head    Vital Signs   Temp:  [97.6 °F (36.4 °C)] 97.6 °F (36.4 °C)  Heart Rate:  [] 89  Resp:  [24] 24  BP: ()/(68-84) 100/80    Physical Exam:  Physical Exam   Constitutional: He appears well-developed. He is cooperative.  Non-toxic appearance. He has a sickly appearance.   Cachectic appearing older male, disheveled, smells horribly of  urine and feces   HENT:   Head: Atraumatic.   Eyes: EOM are normal. Pupils are equal, round, and reactive to light.   Neck: No tracheal deviation present.   Cardiovascular: Normal rate and intact distal pulses.   Pulmonary/Chest: Effort normal.   Abdominal: Soft.   Musculoskeletal: Normal range of motion. He exhibits no deformity.   Purposeful movement of all extremities and to command  Diffuse muscle wasting and atrophy throughout   Neurological: He is alert. He has normal strength. He displays atrophy. He displays no tremor. A cranial nerve deficit is present. Coordination abnormal. GCS eye subscore is 4. GCS verbal subscore is 3. GCS motor subscore is 6. He displays no Babinski's sign on the right side. He displays no Babinski's sign on the left side.   Unable to ambulate  Negative drift, negative tremors  Cranial nerves grossly intact   Skin: Skin is warm and dry.   Numerous bumps and lesions on face, chest and extremities   Psychiatric: He has a normal mood and affect.   Vitals reviewed.    Neurologic Exam     Cranial Nerves     CN III, IV, VI   Pupils are equal, round, and reactive to light.  Extraocular motions are normal.     Motor Exam     Strength   Strength 5/5 throughout.       Assessment/Plan       Altered mental status      PLAN: Came to the ER brought in by EMS after family found him with significant cognitive and personality changes.  The patient was also having difficulty talking and was covered in feces and urine.  CT head showed a huge left frontal lesion.  MRI of the brain with and without contrast ordered for further evaluation.  Admit to ICU.  Will need seizure prophylaxis.      I discussed the patients findings and my recommendations with patient, nursing staff, consulting provider and Dr Rashida Burgos, NAYANA  03/11/19  5:07 PM    Dragon disclaimer:   Much of this encounter note is an electronic transcription/translation of spoken language to printed text. The electronic translation of  spoken language may permit erroneous, or at times, nonsensical words or phrases to be inadvertently transcribed; Although I have reviewed the note for such errors, some may still exist.

## 2019-03-11 NOTE — H&P
"[facility]    CC: UTO    HPI:  Mr. Rojas is a 64yo WM he was last seen by his family around jose time.  He called his niece today stating \"it was the end\".  EMS was called and house was covered in feces in urine.  He is unaware of his current location, very taciturn and so history is limited.    Past Medical History:   Diagnosis Date   • Hepatitis C     TREATED WITH HARVONY   • History of hepatitis B    • Kidney stones    • Rectal cancer (CMS/HCC)      Social History     Socioeconomic History   • Marital status: Single     Spouse name: Not on file   • Number of children: Not on file   • Years of education: Not on file   • Highest education level: Not on file   Tobacco Use   • Smoking status: Former Smoker     Last attempt to quit:      Years since quittin.1   • Smokeless tobacco: Never Used   Substance and Sexual Activity   • Alcohol use: No   • Drug use: No   • Sexual activity: Defer     Family History   Problem Relation Age of Onset   • Malig Hyperthermia Neg Hx      MEDS: ibuprofen only at home  ALL: hydrocodone  ROS: UTO    Vital Sign Min/Max for last 24 hours  Temp  Min: 97.6 °F (36.4 °C)  Max: 97.6 °F (36.4 °C)   BP  Min: 96/68  Max: 113/84   Pulse  Min: 89  Max: 104   Resp  Min: 24  Max: 24   SpO2  Min: 94 %  Max: 98 %   Flow (L/min)  Min: 3  Max: 3   Weight  Min: 49.1 kg (108 lb 4.8 oz)  Max: 49.1 kg (108 lb 4.8 oz)     GEN:   appears ill, thin, Alert   HEENT: PERRL, EOMI, no icterus, mm dry, no jvd, trachea midline, neck supple  CHEST: CTA bilat, no wheezes, no crackles, no use of accessory muscles  CV: tachy, no m/g/r  ABD: soft, nt, nd +bs, no hepatosplenomegaly  EXT: no c/c/e  SKIN: no rashes, no xanthomas, decreased turgor  LYMPH: no palpable cervical or supraclavicular lymphadenopathy  NEURO: CN 2-12 intact and symmetric bilaterally  PSYCH: flat affect, impaired   MSK: diminished musculature, moves all extremities.     Labs:  UDS: +THC  lida 1+; wbc 6-12  Cr 1.18  Na " 148  Bicarb 21  Calcium 10.6  Wbc 10.4 (87%)  hgb 18  plts 244    CT head: reviewed images myself, agree with dictated report    A/P:  1. Brain mass with brain compression -- keppra/dexamethasone as per JOSÉ.   2. Encephalopathy -- 2/2 1  3. UTI -- rocphein  4. Dehydration -- iv fluids  5. Hypernatremia  6. Elevated hematocrit -- iv hydration    Admit to ICU with close monitoring of neurologic condition.     CC 35 mins.

## 2019-03-11 NOTE — ED PROVIDER NOTES
" EMERGENCY DEPARTMENT ENCOUNTER    CHIEF COMPLAINT  Chief Complaint: AMS  History given by: patient, EMS  History limited by: AMS  Room Number: 22/22  PMD: Mary Jo Wells MD      HPI:  Pt is a 63 y.o. male who presents to the ED with AMS. Per EMS, the pt has not talked to his family since Christmas 2018 but then called his niece today stating that \"it was the end.\" Per EMS, the pt's house was covered in feces and urine. Pt is unable to provide any history at this time.    Duration:  unknown  Onset: unknown  Timing: unknown  Quality: AMS  Intensity/Severity: unspecified  Progression: unknown  Associated Symptoms: unknown  Aggravating Factors: unknown  Alleviating Factors: unknown  Previous Episodes: unknown  Treatment before arrival: none    PAST MEDICAL HISTORY  Active Ambulatory Problems     Diagnosis Date Noted   • History of rectal cancer 03/05/2018     Resolved Ambulatory Problems     Diagnosis Date Noted   • No Resolved Ambulatory Problems     Past Medical History:   Diagnosis Date   • Hepatitis C    • History of hepatitis B    • Kidney stones    • Rectal cancer (CMS/HCC) 2013       PAST SURGICAL HISTORY  Past Surgical History:   Procedure Laterality Date   • APPENDECTOMY  04/09/2013    Low Anterior Resection with Colorectal anastomisis, diverting loop ileostomy and appendectomy-Dr. Ben Arreola   • COLONOSCOPY N/A 5/23/2018    Procedure: COLONOSCOPY TO CECUM;  Surgeon: Ben Arreola MD;  Location: [facility] ENDOSCOPY;  Service: Gastroenterology   • FLEXIBLE SIGMOIDOSCOPY  03/14/2013    Dr. Ben Arreola   • FLEXIBLE SIGMOIDOSCOPY  01/07/2013    Dr. Ben Arreola   • LOW ANTERIOR BOWEL RESECTION  04/09/2013    Low Anterior Resection with Colorectal anastomisis, diverting loop ileostomy and appendectomy-Dr. Ben Arreola   • MEDIPORT INSERTION, SINGLE Left 01/07/2013    Dr. Ben Arreola   • MEDIPORT REMOVAL  12/27/2013   • RESECTION SMALL BOWEL / CLOSURE ILEOSTOMY Left 05/21/2013    Dr." Ben Maxwell       FAMILY HISTORY  Family History   Problem Relation Age of Onset   • Malig Hyperthermia Neg Hx        SOCIAL HISTORY  Social History     Socioeconomic History   • Marital status: Single     Spouse name: Not on file   • Number of children: Not on file   • Years of education: Not on file   • Highest education level: Not on file   Social Needs   • Financial resource strain: Not on file   • Food insecurity - worry: Not on file   • Food insecurity - inability: Not on file   • Transportation needs - medical: Not on file   • Transportation needs - non-medical: Not on file   Occupational History   • Not on file   Tobacco Use   • Smoking status: Former Smoker     Last attempt to quit: 2013     Years since quittin.1   • Smokeless tobacco: Never Used   Substance and Sexual Activity   • Alcohol use: No   • Drug use: No   • Sexual activity: Defer   Other Topics Concern   • Not on file   Social History Narrative   • Not on file       ALLERGIES  Hydrocodone    REVIEW OF SYSTEMS  Review of Systems   Unable to perform ROS: Mental status change       PHYSICAL EXAM  ED Triage Vitals   Temp Heart Rate Resp BP SpO2   19 1346 19 1345 19 1345 19 1345 19 1345   97.6 °F (36.4 °C) 104 24 96/68 94 %      Temp src Heart Rate Source Patient Position BP Location FiO2 (%)   19 1346 -- -- -- --   Tympanic           Physical Exam   Constitutional: He appears unhealthy. No distress.   Pt appears disheveled and smells of urine   HENT:   Head: Normocephalic and atraumatic.   Mouth/Throat: Mucous membranes are dry.   Eyes: EOM are normal. Pupils are equal, round, and reactive to light.   Neck: Normal range of motion. Neck supple.   Cardiovascular: Regular rhythm and normal heart sounds. Tachycardia present.   Pulmonary/Chest: Effort normal and breath sounds normal. No respiratory distress.   Abdominal: Soft. There is no tenderness. There is no rebound and no guarding.   Musculoskeletal: Normal  range of motion. He exhibits no edema.   Neurological: He is alert. He displays atrophy (diffuse). He exhibits abnormal muscle tone.   Pt follows commands but does not answer questions. Pt is able to move all extremities   Skin: Skin is dry.   Erythema to right hip and sacrum. Extremities are cool to the touch   Nursing note and vitals reviewed.      LAB RESULTS  Lab Results (last 24 hours)     Procedure Component Value Units Date/Time    POC Glucose Once [[id number]]  (Normal) Collected:  03/11/19 1405    Specimen:  Blood Updated:  03/11/19 1407     Glucose 88 mg/dL     CBC & Differential [[id number]] Collected:  03/11/19 1446    Specimen:  Blood Updated:  03/11/19 1513    Narrative:       The following orders were created for panel order CBC & Differential.  Procedure                               Abnormality         Status                     ---------                               -----------         ------                     CBC Auto Differential[[id number]]         Abnormal            Final result                 Please view results for these tests on the individual orders.    Comprehensive Metabolic Panel [[id number]]  (Abnormal) Collected:  03/11/19 1446    Specimen:  Blood Updated:  03/11/19 1542     Glucose 89 mg/dL      BUN 28 mg/dL      Creatinine 1.18 mg/dL      Sodium 148 mmol/L      Potassium 4.1 mmol/L      Chloride 103 mmol/L      CO2 21.5 mmol/L      Calcium 10.6 mg/dL      Total Protein 7.3 g/dL      Albumin 4.60 g/dL      ALT (SGPT) 20 U/L      AST (SGOT) 23 U/L      Alkaline Phosphatase 91 U/L      Total Bilirubin 2.6 mg/dL      eGFR Non African Amer 63 mL/min/1.73      Globulin 2.7 gm/dL      A/G Ratio 1.7 g/dL      BUN/Creatinine Ratio 23.7     Anion Gap 23.5 mmol/L     Narrative:       GFR Normal >60  Chronic Kidney Disease <60  Kidney Failure <15    Ethanol [[id number]] Collected:  03/11/19 1446    Specimen:  Blood Updated:  03/11/19 1542     Ethanol <10 mg/dL      Ethanol % <0.010 %     CK  [[id number]]  (Abnormal) Collected:  03/11/19 1446    Specimen:  Blood Updated:  03/11/19 1542     Creatine Kinase 201 U/L     Acetaminophen Level [[id number]]  (Abnormal) Collected:  03/11/19 1446    Specimen:  Blood Updated:  03/11/19 1542     Acetaminophen <5.0 mcg/mL     Salicylate Level [[id number]]  (Normal) Collected:  03/11/19 1446    Specimen:  Blood Updated:  03/11/19 1542     Salicylate <0.3 mg/dL     Narrative:       Therapeutic range for Salicylates:  3.0 - 10.0 mg/dL for antipyretic/analgesic conditions  15.0 - 30.0 mg/dL for anti-inflammatory conditions    CBC Auto Differential [[id number]]  (Abnormal) Collected:  03/11/19 1446    Specimen:  Blood Updated:  03/11/19 1513     WBC 10.41 10*3/mm3      RBC 5.70 10*6/mm3      Hemoglobin 18.0 g/dL      Hematocrit 53.6 %      MCV 94.0 fL      MCH 31.6 pg      MCHC 33.6 g/dL      RDW 13.1 %      RDW-SD 45.2 fl      MPV 9.5 fL      Platelets 244 10*3/mm3      Neutrophil % 87.9 %      Lymphocyte % 5.9 %      Monocyte % 5.7 %      Eosinophil % 0.0 %      Basophil % 0.2 %      Immature Grans % 0.3 %      Neutrophils, Absolute 9.16 10*3/mm3      Lymphocytes, Absolute 0.61 10*3/mm3      Monocytes, Absolute 0.59 10*3/mm3      Eosinophils, Absolute 0.00 10*3/mm3      Basophils, Absolute 0.02 10*3/mm3      Immature Grans, Absolute 0.03 10*3/mm3      nRBC 0.0 /100 WBC     Urinalysis With Microscopic If Indicated (No Culture) - Urine, Clean Catch [[id number]]  (Abnormal) Collected:  03/11/19 1447    Specimen:  Urine, Clean Catch Updated:  03/11/19 1520     Color, UA Yellow     Appearance, UA Cloudy     pH, UA 5.5     Specific Gravity, UA 1.023     Glucose, UA Negative     Ketones, UA 40 mg/dL (2+)     Bilirubin, UA Negative     Blood, UA Large (3+)     Protein, UA Trace     Leuk Esterase, UA Small (1+)     Nitrite, UA Negative     Urobilinogen, UA 1.0 E.U./dL    Urine Drug Screen - Urine, Clean Catch [[id number]]  (Abnormal) Collected:  03/11/19 1447    Specimen:  Urine, Clean  Catch Updated:  03/11/19 1535     Amphet/Methamphet, Screen Negative     Barbiturates Screen, Urine Negative     Benzodiazepine Screen, Urine Negative     Cocaine Screen, Urine Negative     Opiate Screen Negative     THC, Screen, Urine Positive     Methadone Screen, Urine Negative     Oxycodone Screen, Urine Negative    Narrative:       Negative Thresholds For Drugs Screened:     Amphetamines               500 ng/ml   Barbiturates               200 ng/ml   Benzodiazepines            100 ng/ml   Cocaine                    300 ng/ml   Methadone                  300 ng/ml   Opiates                    300 ng/ml   Oxycodone                  100 ng/ml   THC                        50 ng/ml    The Normal Value for all drugs tested is negative. This report includes final unconfirmed screening results to be used for medical treatment purposes only. Unconfirmed results must not be used for non-medical purposes such as employment or legal testing. Clinical consideration should be applied to any drug of abuse test, particulary when unconfirmed results are used.    Urinalysis, Microscopic Only - Urine, Clean Catch [[id number]]  (Abnormal) Collected:  03/11/19 1447    Specimen:  Urine, Clean Catch Updated:  03/11/19 1534     RBC, UA Too Numerous to Count /HPF      WBC, UA 6-12 /HPF      Bacteria, UA 1+ /HPF      Squamous Epithelial Cells, UA None Seen /HPF      Hyaline Casts, UA 7-12 /LPF      Methodology Manual Light Microscopy          I ordered the above labs and reviewed the results    RADIOLOGY  CT Head Without Contrast   Final Result       Critical test result. A large mass of the left frontal lobe, possibly   containing foci of hemorrhage, as described above.       Discussed by telephone with Dr. Edwards at 1610, 3/11/2019.       This report was finalized on 3/11/2019 4:20 PM by Dr. Kamaljit Feliciano M.D.          XR Chest 1 View   Final Result   Appearance of fibronodular change at the right apex,   appears more  "conspicuous when correlated with the CT from 6/10/2015, CT   can be obtained for direct comparison with prior CT exam.        This report was finalized on 3/11/2019 3:42 PM by Dr. Kamaljit Feliciano M.D.               I ordered the above noted radiological studies. Interpreted by radiologist. Discussed with radiologist (Dr. Feliciano). Reviewed by me in PACS.       PROCEDURES  Procedures  EKG          EKG time: 1443  Rhythm/Rate: NSR, 95  P waves and PA: normal  QRS, axis: LVH   ST and T waves: normal     Interpreted Contemporaneously by me, independently viewed  changed compared to prior on 4/13/13 (LVH is new)    PROGRESS AND CONSULTS  ED Course as of Mar 11 1649   Mon Mar 11, 2019   1648 4:48 PM  Patient here for altered mental status and has a large brain mass.  Seen by Dr. Field.  Will be admitted to ICU.  Keppra.  Will add hypertonic saline upstairs.  Discussed with Dr. Chaidez who will admit to ICU.  [SL]      ED Course User Index  [SL] Felix Edwards MD     1424- Discussed the plan to order lab and imaging studies for further evaluation of the pt's symptoms. Pt nodded that he understands and agrees with the plan, all questions answered.    1428- Ordered blood work, UA, CK, acetaminophen level, salicylate level, UDS, ethanol level, EKG, CXR and CT head for further evaluation.    1607- Pt's sister is in the ED. She states that the pt called her daughter to say that \"it was the end\". She states that no one has talked to the pt since around Christmas 2018 but that the pt is altered from his baseline (ex. Does not recognize her). Notified pt's sister of the pt's lab and imaging results, including the pt's CT Head results, and that the pt will be admitted. All questions were addressed.    1613- Placed call to neurosurgery for consult.    1629- Discussed the pt's case with Dr. Field (neurosurgery) in the ED. He agrees to consult but defers admission to pulmonology. He requests that I order Keppra but states that " he will order hypertonic saline once the pt is in the ICU.    1632- Ordered Keppra for seizure prevention. Placed call to pulmonology for admission.    1644- Discussed the pt's case with Dr. Fernando Chiadez (pulmonology), who agrees to admit the pt to the ICU.     MEDICAL DECISION MAKING  Results were reviewed/discussed with the patient and they were also made aware of online access. Pt also made aware that some labs, such as cultures, will not be resulted during ER visit and follow up with PMD is necessary.     MDM  Number of Diagnoses or Management Options  Altered mental status, unspecified altered mental status type:   Brain mass:      Amount and/or Complexity of Data Reviewed  Clinical lab tests: ordered and reviewed (RB=169)  Tests in the radiology section of CPT®: ordered and reviewed (CT Head shows a 6.0x5.7cm left sided mass with effacement of ventricles and questionable hemorrhage)  Tests in the medicine section of CPT®: ordered and reviewed (See EKG procedure note)  Discussion of test results with the performing providers: yes (Dr. Feliciano)  Decide to obtain previous medical records or to obtain history from someone other than the patient: yes  Review and summarize past medical records: yes (Pt had an unremarkable colonoscopy in May 2018)  Discuss the patient with other providers: yes (Dr. Field (neurosurgery),  Dr. Fernando Chaidez (pulmonology))  Independent visualization of images, tracings, or specimens: yes        Pt is not a tPA candidate due to unknown onset of symptoms.    DIAGNOSIS  Final diagnoses:   Altered mental status, unspecified altered mental status type   Brain mass       DISPOSITION  ADMISSION    Discussed treatment plan and reason for admission with pt/family and admitting physician.  Pt/family voiced understanding of the plan for admission for further testing/treatment as needed.     Latest Documented Vital Signs:  As of 4:49 PM  BP- 100/80 HR- 89 Temp- 97.6 °F (36.4 °C) (Tympanic) O2 sat-  98%    --  Documentation assistance provided by nissa Jones for Dr. Edwards.  Information recorded by the scribe was done at my direction and has been verified and validated by me.     Noelle Jones  03/11/19 4613       Felix Edwards MD  03/11/19 8985

## 2019-03-11 NOTE — ED NOTES
"Upon arrival per EMS, the pt was found in his home. His home was \"filthy dirty covered in feces and urine\" . Upon arrival to ER pt was found to be  Filthy dirty, covered in soiled clothing, with dried on stool and urine all over himself. Pt is awake, but unable to communicate with expressive aphasia     Tri Maria RN  03/11/19 1500    "

## 2019-03-11 NOTE — ED NOTES
Report received from RANI Bartlett. Awaiting clean room status.     Kathleen Napoles RN  03/11/19 1955

## 2019-03-12 NOTE — PLAN OF CARE
Problem: Patient Care Overview  Goal: Plan of Care Review  Outcome: Ongoing (interventions implemented as appropriate)   03/12/19 4740   Coping/Psychosocial   Plan of Care Reviewed With patient   OTHER   Outcome Summary Pt stable in ICU. MRI completed today. Report available. Vital signs stable. Family updated. Will continue to monitor.    Plan of Care Review   Progress no change     Goal: Individualization and Mutuality  Outcome: Ongoing (interventions implemented as appropriate)    Goal: Discharge Needs Assessment  Outcome: Ongoing (interventions implemented as appropriate)    Goal: Interprofessional Rounds/Family Conf  Outcome: Ongoing (interventions implemented as appropriate)      Problem: Nutrition, Imbalanced: Inadequate Oral Intake (Adult)  Goal: Improved Oral Intake  Outcome: Ongoing (interventions implemented as appropriate)    Goal: Prevent Further Weight Loss  Outcome: Ongoing (interventions implemented as appropriate)

## 2019-03-12 NOTE — PLAN OF CARE
Problem: Nutrition, Imbalanced: Inadequate Oral Intake (Adult)  Intervention: Promote/Optimize Nutrition   03/12/19 1315   Nutrition Interventions   Oral Nutrition Promotion nutritional therapy counseling provided, MSA complete. RD to follow. Await po       Goal: Identify Related Risk Factors and Signs and Symptoms  Outcome: Outcome(s) achieved Date Met: 03/12/19    Goal: Improved Oral Intake  Outcome: Ongoing (interventions implemented as appropriate)    Goal: Prevent Further Weight Loss  Outcome: Ongoing (interventions implemented as appropriate)

## 2019-03-12 NOTE — PROGRESS NOTES
[facility] PROGRESS NOTE    PATIENT IDENTIFICATION:   Name:  Jamel Rojas      MRN:  [id number]     63 y.o.  male               CC: left frontal brain mass/ confusion      Subjective     Interval History: has no complaint of pain, HA or dizziness. C/o R arm pain    Objective     Vital signs in last 24 hours:  Temp:  [97.4 °F (36.3 °C)-98.2 °F (36.8 °C)] 97.4 °F (36.3 °C)  Heart Rate:  [] 67  Resp:  [24] 24  BP: ()/() 119/86  ICP ranges-    Intake/Output last 3 shifts:  I/O last 3 completed shifts:  In: 2416 [I.V.:2366; IV Piggyback:50]  Out: -     Intake/Output this shift:  No intake/output data recorded.      Physical Exam:  General:   Awake, alert, and oriented x2- person and place. NAD  Disheveled appearing   Expressive aphasia   minimal 1-2 word speech  CN grossly intact  R UE drift  STARR well, with the exception of R UE 2/2 pain  Finger to nose intact B        LABS:    Lab Results (last 24 hours)     Procedure Component Value Units Date/Time    POC Glucose Once [[id number]]  (Normal) Collected:  03/11/19 1405    Specimen:  Blood Updated:  03/11/19 1407     Glucose 88 mg/dL     CBC & Differential [[id number]] Collected:  03/11/19 1446    Specimen:  Blood Updated:  03/11/19 6073    Narrative:       The following orders were created for panel order CBC & Differential.  Procedure                               Abnormality         Status                     ---------                               -----------         ------                     CBC Auto Differential[[id number]]         Abnormal            Final result                 Please view results for these tests on the individual orders.    Comprehensive Metabolic Panel [[id number]]  (Abnormal) Collected:  03/11/19 1446    Specimen:  Blood Updated:  03/11/19 1542     Glucose 89 mg/dL      BUN 28 mg/dL      Creatinine 1.18 mg/dL      Sodium 148 mmol/L      Potassium 4.1 mmol/L      Chloride 103 mmol/L      CO2 21.5 mmol/L       Calcium 10.6 mg/dL      Total Protein 7.3 g/dL      Albumin 4.60 g/dL      ALT (SGPT) 20 U/L      AST (SGOT) 23 U/L      Alkaline Phosphatase 91 U/L      Total Bilirubin 2.6 mg/dL      eGFR Non African Amer 63 mL/min/1.73      Globulin 2.7 gm/dL      A/G Ratio 1.7 g/dL      BUN/Creatinine Ratio 23.7     Anion Gap 23.5 mmol/L     Narrative:       GFR Normal >60  Chronic Kidney Disease <60  Kidney Failure <15    Ethanol [[id number]] Collected:  03/11/19 1446    Specimen:  Blood Updated:  03/11/19 1542     Ethanol <10 mg/dL      Ethanol % <0.010 %     CK [[id number]]  (Abnormal) Collected:  03/11/19 1446    Specimen:  Blood Updated:  03/11/19 1542     Creatine Kinase 201 U/L     Acetaminophen Level [[id number]]  (Abnormal) Collected:  03/11/19 1446    Specimen:  Blood Updated:  03/11/19 1542     Acetaminophen <5.0 mcg/mL     Salicylate Level [[id number]]  (Normal) Collected:  03/11/19 1446    Specimen:  Blood Updated:  03/11/19 1542     Salicylate <0.3 mg/dL     Narrative:       Therapeutic range for Salicylates:  3.0 - 10.0 mg/dL for antipyretic/analgesic conditions  15.0 - 30.0 mg/dL for anti-inflammatory conditions    CBC Auto Differential [[id number]]  (Abnormal) Collected:  03/11/19 1446    Specimen:  Blood Updated:  03/11/19 1513     WBC 10.41 10*3/mm3      RBC 5.70 10*6/mm3      Hemoglobin 18.0 g/dL      Hematocrit 53.6 %      MCV 94.0 fL      MCH 31.6 pg      MCHC 33.6 g/dL      RDW 13.1 %      RDW-SD 45.2 fl      MPV 9.5 fL      Platelets 244 10*3/mm3      Neutrophil % 87.9 %      Lymphocyte % 5.9 %      Monocyte % 5.7 %      Eosinophil % 0.0 %      Basophil % 0.2 %      Immature Grans % 0.3 %      Neutrophils, Absolute 9.16 10*3/mm3      Lymphocytes, Absolute 0.61 10*3/mm3      Monocytes, Absolute 0.59 10*3/mm3      Eosinophils, Absolute 0.00 10*3/mm3      Basophils, Absolute 0.02 10*3/mm3      Immature Grans, Absolute 0.03 10*3/mm3      nRBC 0.0 /100 WBC     Urinalysis With Microscopic If Indicated (No Culture) -  Urine, Clean Catch [[id number]]  (Abnormal) Collected:  03/11/19 1447    Specimen:  Urine, Clean Catch Updated:  03/11/19 1520     Color, UA Yellow     Appearance, UA Cloudy     pH, UA 5.5     Specific Gravity, UA 1.023     Glucose, UA Negative     Ketones, UA 40 mg/dL (2+)     Bilirubin, UA Negative     Blood, UA Large (3+)     Protein, UA Trace     Leuk Esterase, UA Small (1+)     Nitrite, UA Negative     Urobilinogen, UA 1.0 E.U./dL    Urine Drug Screen - Urine, Clean Catch [[id number]]  (Abnormal) Collected:  03/11/19 1447    Specimen:  Urine, Clean Catch Updated:  03/11/19 1535     Amphet/Methamphet, Screen Negative     Barbiturates Screen, Urine Negative     Benzodiazepine Screen, Urine Negative     Cocaine Screen, Urine Negative     Opiate Screen Negative     THC, Screen, Urine Positive     Methadone Screen, Urine Negative     Oxycodone Screen, Urine Negative    Narrative:       Negative Thresholds For Drugs Screened:     Amphetamines               500 ng/ml   Barbiturates               200 ng/ml   Benzodiazepines            100 ng/ml   Cocaine                    300 ng/ml   Methadone                  300 ng/ml   Opiates                    300 ng/ml   Oxycodone                  100 ng/ml   THC                        50 ng/ml    The Normal Value for all drugs tested is negative. This report includes final unconfirmed screening results to be used for medical treatment purposes only. Unconfirmed results must not be used for non-medical purposes such as employment or legal testing. Clinical consideration should be applied to any drug of abuse test, particulary when unconfirmed results are used.    Urinalysis, Microscopic Only - Urine, Clean Catch [[id number]]  (Abnormal) Collected:  03/11/19 1447    Specimen:  Urine, Clean Catch Updated:  03/11/19 1534     RBC, UA Too Numerous to Count /HPF      WBC, UA 6-12 /HPF      Bacteria, UA 1+ /HPF      Squamous Epithelial Cells, UA None Seen /HPF      Hyaline Casts, UA 7-12  /LPF      Methodology Manual Light Microscopy    POC Glucose Once [[id number]]  (Normal) Collected:  03/11/19 2011    Specimen:  Blood Updated:  03/11/19 2012     Glucose 81 mg/dL     POC Glucose Once [[id number]]  (Abnormal) Collected:  03/11/19 2329    Specimen:  Blood Updated:  03/11/19 2330     Glucose 217 mg/dL     POC Glucose Once [[id number]]  (Abnormal) Collected:  03/12/19 0140    Specimen:  Blood Updated:  03/12/19 0141     Glucose 189 mg/dL     POC Glucose Once [[id number]]  (Normal) Collected:  03/12/19 0546    Specimen:  Blood Updated:  03/12/19 0549     Glucose 76 mg/dL     CBC & Differential [[id number]] Collected:  03/12/19 0608    Specimen:  Blood Updated:  03/12/19 0632    Narrative:       The following orders were created for panel order CBC & Differential.  Procedure                               Abnormality         Status                     ---------                               -----------         ------                     CBC Auto Differential[[id number]]        Abnormal            Final result                 Please view results for these tests on the individual orders.    Comprehensive Metabolic Panel [[id number]]  (Abnormal) Collected:  03/12/19 0608    Specimen:  Blood from Arm, Right Updated:  03/12/19 0659     Glucose 92 mg/dL      BUN 29 mg/dL      Creatinine 0.89 mg/dL      Sodium 152 mmol/L      Potassium 4.0 mmol/L      Chloride 113 mmol/L      CO2 24.3 mmol/L      Calcium 9.6 mg/dL      Total Protein 6.6 g/dL      Albumin 4.00 g/dL      ALT (SGPT) 16 U/L      AST (SGOT) 19 U/L      Alkaline Phosphatase 78 U/L      Total Bilirubin 1.6 mg/dL      eGFR Non African Amer 87 mL/min/1.73      Globulin 2.6 gm/dL      A/G Ratio 1.5 g/dL      BUN/Creatinine Ratio 32.6     Anion Gap 14.7 mmol/L     Narrative:       GFR Normal >60  Chronic Kidney Disease <60  Kidney Failure <15    CBC Auto Differential [[id number]]  (Abnormal) Collected:  03/12/19 0608    Specimen:  Blood from Arm, Right Updated:   03/12/19 0632     WBC 8.67 10*3/mm3      RBC 5.10 10*6/mm3      Hemoglobin 16.2 g/dL      Hematocrit 47.8 %      MCV 93.7 fL      MCH 31.8 pg      MCHC 33.9 g/dL      RDW 13.1 %      RDW-SD 45.1 fl      MPV 9.6 fL      Platelets 216 10*3/mm3      Neutrophil % 93.1 %      Lymphocyte % 4.2 %      Monocyte % 2.4 %      Eosinophil % 0.0 %      Basophil % 0.0 %      Immature Grans % 0.3 %      Neutrophils, Absolute 8.07 10*3/mm3      Lymphocytes, Absolute 0.36 10*3/mm3      Monocytes, Absolute 0.21 10*3/mm3      Eosinophils, Absolute 0.00 10*3/mm3      Basophils, Absolute 0.00 10*3/mm3      Immature Grans, Absolute 0.03 10*3/mm3      nRBC 0.0 /100 WBC     POC Glucose Once [[id number]]  (Normal) Collected:  03/12/19 1105    Specimen:  Blood Updated:  03/12/19 1107     Glucose 123 mg/dL           IMAGING STUDIES:  Pending MRI brain    Meds reviewed/changed: Yes    Current Facility-Administered Medications   Medication Dose Route Frequency Provider Last Rate Last Dose   • acetaminophen (TYLENOL) tablet 650 mg  650 mg Oral Q4H PRN Fernando Chaidez MD        Or   • acetaminophen (TYLENOL) suppository 650 mg  650 mg Rectal Q4H PRN Fernando Chaidez MD       • dexamethasone sodium phosphate injection 4 mg  4 mg Intravenous Q8H Heidy Burgos APRN   4 mg at 03/12/19 0917   • dextrose (D50W) 25 g/ 50mL Intravenous Solution 25 g  25 g Intravenous Q15 Min PRN Fernando Chaidez MD       • dextrose (GLUTOSE) oral gel 15 g  15 g Oral Q15 Min PRN Fernando Chaidez MD       • dextrose 5 % and sodium chloride 0.45 % infusion  50 mL/hr Intravenous Continuous Alek Simeon MD 50 mL/hr at 03/12/19 1109 50 mL/hr at 03/12/19 1109   • glucagon (human recombinant) (GLUCAGEN DIAGNOSTIC) injection 1 mg  1 mg Subcutaneous PRN Fernando Chaidez MD       • [START ON 3/13/2019] Influenza Vac Subunit Quad (FLUCELVAX) injection 0.5 mL  0.5 mL Intramuscular Once Alek Simeon MD       • insulin lispro (humaLOG) injection 0-7 Units  0-7 Units  Subcutaneous Q6H Fernando Chaidez MD   2 Units at 03/12/19 0000   • ipratropium-albuterol (DUO-NEB) nebulizer solution 3 mL  3 mL Nebulization Q2H PRN Fernando Chaidez MD       • levETIRAcetam in NaCl 0.75% (KEPPRA) IVPB 1,000 mg  1,000 mg Intravenous Q12H Alek Simeon MD       • ondansetron (ZOFRAN) tablet 4 mg  4 mg Oral Q6H PRN Fernando Chaidez MD        Or   • ondansetron ODT (ZOFRAN-ODT) disintegrating tablet 4 mg  4 mg Oral Q6H PRN Fernando Chaidez MD        Or   • ondansetron (ZOFRAN) injection 4 mg  4 mg Intravenous Q6H PRN Fernando Chaidez MD       • potassium chloride (MICRO-K) CR capsule 40 mEq  40 mEq Oral PRN Fernando Chaidez MD        Or   • potassium chloride (KLOR-CON) packet 40 mEq  40 mEq Oral PRN Fernando Chaidez MD        Or   • potassium chloride 10 mEq in 100 mL IVPB  10 mEq Intravenous Q1H PRN Fernando Chaidez MD       • sennosides-docusate sodium (SENOKOT-S) 8.6-50 MG tablet 2 tablet  2 tablet Oral Nightly Fernando Chaidez MD       • sodium chloride 0.9 % flush 3-10 mL  3-10 mL Intravenous PRN Fernando Chaidez MD           Assessment/Plan     ASSESSMENT:      Altered mental status      PLAN: He is a little more responsive today and speech is more clear.  He still has significant expressive aphasia.  Pending MRI today.  Discussion with patient's niece at bedside regarding the situation, she is next of kin.  Continue the same for now.    I discussed the patients findings and my recommendations with patient, family, nursing staff and Dr Field       LOS: 1 day       Heidy Burgos, APRN  3/12/2019  11:30 AM

## 2019-03-12 NOTE — PROGRESS NOTES
Dr. TOMASZ Simeon    [facility] INTENSIVE CARE    3/12/2019    Patient ID:  Name:  Jamel Rojas  MRN:  [id number]  1955  63 y.o.  male            CC/Reason for visit: Acute confusion, brain mass    HPI: Patient remains confused.  Denies headache when I asked him.  Family at bedside very anxious about MRI today    ROS: No seizures.  Still confused.  No chest pain    Vitals:  Vitals:    03/12/19 1105 03/12/19 1106 03/12/19 1205 03/12/19 1305   BP: 119/86  113/86 113/79   Pulse: 67  62 65   Resp:       Temp:  97.4 °F (36.3 °C)     TempSrc:  Oral     SpO2: 100%  99% 99%   Weight:               Body mass index is 16.73 kg/m².    Intake/Output Summary (Last 24 hours) at 3/12/2019 1339  Last data filed at 3/12/2019 0700  Gross per 24 hour   Intake 2416 ml   Output --   Net 2416 ml       Exam:  GEN:  No distress  Alert, oriented x1  LUNGS: Clear breath sounds bilat, nonlabored breathing  CV:  Normal S1S2, without murmur, no edema  ABD:  Non tender, no enlarged liver or masses  EXT:  No cyanosis or clubbing    Scheduled meds:    dexamethasone 4 mg Intravenous Q8H   [START ON 3/13/2019] influenza vaccine 0.5 mL Intramuscular Once   insulin lispro 0-7 Units Subcutaneous Q6H   levETIRAcetam 1,000 mg Intravenous Q12H   sennosides-docusate sodium 2 tablet Oral Nightly     IV meds:                        dextrose 5 % and sodium chloride 0.45 % 50 mL/hr Last Rate: 50 mL/hr (03/12/19 1109)       Data Review:   I reviewed the patient's medications and new clinical results.  Lab Results   Component Value Date    CALCIUM 9.6 03/12/2019     Results from last 7 days   Lab Units 03/12/19  0608 03/11/19  1446   SODIUM mmol/L 152* 148*   POTASSIUM mmol/L 4.0 4.1   CHLORIDE mmol/L 113* 103   CO2 mmol/L 24.3 21.5*   BUN mg/dL 29* 28*   CREATININE mg/dL 0.89 1.18   CALCIUM mg/dL 9.6 10.6*   BILIRUBIN mg/dL 1.6* 2.6*   ALK PHOS U/L 78 91   ALT (SGPT) U/L 16 20   AST (SGOT) U/L 19 23   GLUCOSE mg/dL 92 89   WBC 10*3/mm3  8.67 10.41   HEMOGLOBIN g/dL 16.2 18.0*   PLATELETS 10*3/mm3 216 244         ASSESSMENT:     Altered mental status  Brain mass  Brain compression  Dehydration  Hypernatremia      PLAN:  Remain in the ICU for now.  Patient is encephalopathic from a large mass in the brain, causing significant confusion with brain swelling, all of which are critical findings on admission.  Patient remains confused.  MRI is planned for today to better assess type of brain mass and its surrounding tissues.  Appreciate input from neurosurgery.  Continue Keppra and dexamethasone.  Nothing by mouth for now until okay per neurosurgery.    This patient has several chronic medical conditions, and now several acute medical illnesses.  Extensive amount of data was reviewed.  Images were directly visualized by me.  This patient warrants high complexity medical decision-making.          Alek Simeon MD  3/12/2019

## 2019-03-12 NOTE — THERAPY EVALUATION
Acute Care - Physical Therapy Initial Evaluation  [facility]     Patient Name: Jamel Rojas  : 1955  MRN: [id number]  Today's Date: 3/12/2019   Onset of Illness/Injury or Date of Surgery: (P) 19            Admit Date: 3/11/2019    Visit Dx:     ICD-10-CM ICD-9-CM   1. Altered mental status, unspecified altered mental status type R41.82 780.97   2. Brain mass G93.9 348.9     Patient Active Problem List   Diagnosis   • History of rectal cancer   • Altered mental status     Past Medical History:   Diagnosis Date   • Hepatitis C     TREATED WITH HARVONY   • History of hepatitis B    • Kidney stones    • Rectal cancer (CMS/HCC)      Past Surgical History:   Procedure Laterality Date   • APPENDECTOMY  2013    Low Anterior Resection with Colorectal anastomisis, diverting loop ileostomy and appendectomy-Dr. Ben Arreola   • COLONOSCOPY N/A 2018    Procedure: COLONOSCOPY TO CECUM;  Surgeon: Ben Arreola MD;  Location: [facility] ENDOSCOPY;  Service: Gastroenterology   • FLEXIBLE SIGMOIDOSCOPY  2013    Dr. Ben Arreola   • FLEXIBLE SIGMOIDOSCOPY  2013    Dr. Ben Arreola   • LOW ANTERIOR BOWEL RESECTION  2013    Low Anterior Resection with Colorectal anastomisis, diverting loop ileostomy and appendectomy-Dr. Ben Arreola   • MEDIPORT INSERTION, SINGLE Left 2013    Dr. Ben Arreola   • MEDIPORT REMOVAL  2013   • RESECTION SMALL BOWEL / CLOSURE ILEOSTOMY Left 2013    Dr. Ben Arreola        PT ASSESSMENT (last 12 hours)      Physical Therapy Evaluation     Row Name 19 1154          PT Evaluation Time/Intention    Subjective Information  no complaints  (Pended)   -CG     Document Type  evaluation  (Pended)   -CG     Mode of Treatment  physical therapy  (Pended)   -CG     Patient Effort  good  (Pended)   -CG     Row Name 19 1908          General Information    Patient Profile Reviewed?  yes  (Pended)   -CG     Onset of  "Illness/Injury or Date of Surgery  03/11/19  (Pended)   -CG     Patient Observations  alert;cooperative;agree to therapy  (Pended)   -CG     Patient/Family Observations  Pt lying supine in bed, no signs of acute distress at rest  (Pended)   -CG     General Observations of Patient  Pt w/ expressive aphasia, slow to answer questions, occasional \"yes\" or \"no\"  (Pended)   -CG     Prior Level of Function  independent:;all household mobility;community mobility;gait;transfer  (Pended)   -CG     Equipment Currently Used at Home  none  (Pended)   -CG     Pertinent History of Current Functional Problem  Pt brought to ED w/ AMS 3/11. Large L frontal masses, expressive aphasia  (Pended)   -CG     Existing Precautions/Restrictions  fall  (Pended)   -CG     Barriers to Rehab  medically complex;cognitive status  (Pended)   -     Row Name 03/12/19 1154          Cognitive Assessment/Interventions    Additional Documentation  Cognitive Assessment/Intervention (Group)  (Pended)   -     Row Name 03/12/19 1151          Cognitive Assessment/Intervention- PT/OT    Orientation Status (Cognition)  oriented to;person;disoriented to;place;situation  (Pended)   -CG     Follows Commands (Cognition)  25-49% accuracy;does not follow one step commands;increased processing time needed;physical/tactile prompts required;repetition of directions required  (Pended)   -CG     Personal Safety Interventions  gait belt;nonskid shoes/slippers when out of bed;fall prevention program maintained  (Pended)   -     Row Name 03/12/19 0294          Bed Mobility Assessment/Treatment    Bed Mobility Assessment/Treatment  supine-sit;sit-supine;scooting/bridging  (Pended)   -CG     Scooting/Bridging [place] (Bed Mobility)  supervision  (Pended)   -CG     Supine-Sit [place] (Bed Mobility)  contact guard;verbal cues  (Pended)   -CG     Sit-Supine [place] (Bed Mobility)  minimum assist (75% patient effort);2 person assist;verbal cues  (Pended)   -CG  "    Assistive Device (Bed Mobility)  bed rails;head of bed elevated  (Pended)   -CG     Comment (Bed Mobility)  Pt able to come to sit w/ supervision only, requires min A for trunk support while moving to EOB  (Pended)   -     Row Name 03/12/19 1154          Transfer Assessment/Treatment    Transfer Assessment/Treatment  sit-stand transfer;stand-sit transfer  (Pended)   -     Comment (Transfers)  Pt leans R and slightly posteriorly unknowlingly. Min A required to maintain midline, block R knee from buckling w/ fatigue  (Pended)   -CG     Sit-Stand [place] (Transfers)  minimum assist (75% patient effort);2 person assist;nonverbal cues (demo/gesture);verbal cues  (Pended)   -CG     Stand-Sit [place] (Transfers)  verbal cues;nonverbal cues (demo/gesture);minimum assist (75% patient effort);2 person assist  (Pended)   -     Row Name 03/12/19 1154          Sit-Stand Transfer    Assistive Device (Sit-Stand Transfers)  --  (Pended)  HHAx2  -     Row Name 03/12/19 1154          Stand-Sit Transfer    Assistive Device (Stand-Sit Transfers)  --  (Pended)  HHAx2  -     Row Name 03/12/19 1154          Gait/Stairs Assessment/Training    [place] Level (Gait)  moderate assist (50% patient effort);2 person assist;verbal cues;nonverbal cues (demo/gesture)  (Pended)   -     Assistive Device (Gait)  other (see comments)  (Pended)  HHAx2  -     Distance in Feet (Gait)  12  (Pended)  3 ft fwd and bckwd x2, seated rest break between  -CG     Pattern (Gait)  step-to  (Pended)   -CG     Deviations/Abnormal Patterns (Gait)  right sided deviations;gemma decreased;festinating/shuffling;gait speed decreased;stride length decreased  (Pended)   -CG     Bilateral Gait Deviations  forward flexed posture  (Pended)   -CG     Right Sided Gait Deviations  knee buckling, right side;weight shift ability decreased;leans right  (Pended)   -CG     Comment (Gait/Stairs)  Pt able to complete fwd and bckwd steps w/ increased  reliance on HHAx2 and assist for weight shifting. R LE unable to clear ground but able to hold weight for advancement of L LE until slight buckling w/ fatigue in 2nd trial  (Pended)   -CG     Row Name 03/12/19 1154          General ROM    GENERAL ROM COMMENTS  Grossly L UE and LE are WFL for age, R UE and LE AROM diminished, PROM WFL  (Pended)   -CG     Row Name 03/12/19 1154          MMT (Manual Muscle Testing)    General MMT Comments  Grossly L UE and LE >=4/5. R UE and LE 3-/5 in open chain, able to complete about half of full ROM, when placed in position can hold against gravity about 3 sec  (Pended)   -CG     Row Name 03/12/19 1154          Motor Assessment/Intervention    Additional Documentation  Balance (Group)  (Pended)   -CG     Row Name 03/12/19 1154          Balance    Balance  dynamic standing balance;static standing balance  (Pended)   -CG     Row Name 03/12/19 1154          Static Standing Balance    Level of [place] (Supported Standing, Static Balance)  contact guard assist;2 person assist  (Pended)   -     Time Able to Maintain Position (Supported Standing, Static Balance)  15 to 30 seconds  (Pended)  before requiring min A for post and R lean  -CG     Row Name 03/12/19 1154          Dynamic Standing Balance    Level of [place], Reaches Outside Midline (Standing, Dynamic Balance)  moderate assist, 50 to 74% patient effort;2 person assist  (Pended)   -CG     Row Name 03/12/19 1154          Pain Assessment    Additional Documentation  Pain Scale: Numbers Pre/Post-Treatment (Group)  (Pended)   -CG     Row Name 03/12/19 1154          Pain Scale: Numbers Pre/Post-Treatment    Pain Scale: Numbers, Pretreatment  0/10 - no pain  (Pended)   -     Pain Scale: Numbers, Post-Treatment  0/10 - no pain  (Pended)   -CG     Row Name 03/12/19 1150          Plan of Care Review    Plan of Care Reviewed With  patient  (Pended)   -CG     Row Name 03/12/19 1157          Physical Therapy Clinical Impression     Patient/Family Goals Statement (PT Clinical Impression)  Unable to obtain d/t mental status  (Pended)   -CG     Criteria for Skilled Interventions Met (PT Clinical Impression)  treatment indicated  (Pended)   -CG     Impairments Found (describe specific impairments)  aerobic capacity/endurance;gait, locomotion, and balance;muscle performance  (Pended)   -CG     Rehab Potential (PT Clinical Summary)  good, to achieve stated therapy goals  (Pended)   -CG     Row Name 03/12/19 1159          Physical Therapy Goals    Bed Mobility Goal Selection (PT)  bed mobility, PT goal 1  (Pended)   -CG     Transfer Goal Selection (PT)  transfer, PT goal 1  (Pended)   -CG     Gait Training Goal Selection (PT)  gait training, PT goal 1  (Pended)   -CG     Row Name 03/12/19 1159          Bed Mobility Goal 1 (PT)    Activity/Assistive Device (Bed Mobility Goal 1, PT)  bed mobility activities, all  (Pended)   -CG     [place] Level/Cues Needed (Bed Mobility Goal 1, PT)  supervision required  (Pended)   -CG     Time Frame (Bed Mobility Goal 1, PT)  1 week  (Pended)   -CG     Row Name 03/12/19 4888          Transfer Goal 1 (PT)    Activity/Assistive Device (Transfer Goal 1, PT)  transfers, all  (Pended)   -CG     [place] Level/Cues Needed (Transfer Goal 1, PT)  supervision required  (Pended)   -CG     Time Frame (Transfer Goal 1, PT)  1 week  (Pended)   -CG     Row Name 03/12/19 2082          Gait Training Goal 1 (PT)    Activity/Assistive Device (Gait Training Goal 1, PT)  gait (walking locomotion)  (Pended)   -CG     [place] Level (Gait Training Goal 1, PT)  contact guard assist  (Pended)   -CG     Distance (Gait Goal 1, PT)  100  (Pended)   -CG     Row Name 03/12/19 0973          Positioning and Restraints    Pre-Treatment Position  in bed  (Pended)   -CG     Post Treatment Position  bed  (Pended)   -CG     In Bed  notified nsg;supine;call light within reach;encouraged to call for assist;exit alarm on  (Pended)   -CG        User Key  (r) = Recorded By, (t) = Taken By, (c) = Cosigned By    Initials Name Provider Type    CG Alex Sargent, PT Student PT Student        Physical Therapy Education     Title: PT OT SLP Therapies (In Progress)     Topic: Physical Therapy (In Progress)     Point: Mobility training (In Progress)     Learning Progress Summary           Patient Acceptance, E,TB,D, NR by CG at 3/12/2019  1:01 PM                   Point: Home exercise program (In Progress)     Learning Progress Summary           Patient Acceptance, E,TB,D, NR by CG at 3/12/2019  1:01 PM                   Point: Body mechanics (In Progress)     Learning Progress Summary           Patient Acceptance, E,TB,D, NR by CG at 3/12/2019  1:01 PM                               User Key     Initials Effective Dates Name Provider Type Discipline     02/04/19 -  Alex Sargent, PT Student PT Student PT              PT Recommendation and Plan  Anticipated Discharge Disposition (PT): (P) skilled nursing facility, inpatient rehabilitation facility  Planned Therapy Interventions (PT Eval): (P) balance training, bed mobility training, gait training, home exercise program, strengthening, patient/family education  Therapy Frequency (PT Clinical Impression): (P) daily  Outcome Summary/Treatment Plan (PT)  Anticipated Discharge Disposition (PT): (P) skilled nursing facility, inpatient rehabilitation facility  Plan of Care Reviewed With: (P) patient  Outcome Summary: (P) Pt presents to PT for evaluation w/ decreased functional mobility, altered gait, and activity endurance secondary to decreased strength and AROM on R UE and L UE d/t L frontal mass. Pt completed bed mobility w/ min A and took few steps w/ Mod Ax2. PT will follow to address strength, transfers, balance and functional mobility deficits.  Outcome Measures     Row Name 03/12/19 1300             How much help from another person do you currently need...    Turning from your back to your side  while in flat bed without using bedrails?  3  (Pended)   -CG      Moving from lying on back to sitting on the side of a flat bed without bedrails?  3  (Pended)   -CG      Moving to and from a bed to a chair (including a wheelchair)?  3  (Pended)   -CG      Standing up from a chair using your arms (e.g., wheelchair, bedside chair)?  2  (Pended)   -CG      Climbing 3-5 steps with a railing?  1  (Pended)   -CG      To walk in hospital room?  2  (Pended)   -CG      AM-PAC 6 Clicks Score  14  (Pended)   -CG         Functional Assessment    Outcome Measure Options  AM-PAC 6 Clicks Basic Mobility (PT)  (Pended)   -CG        User Key  (r) = Recorded By, (t) = Taken By, (c) = Cosigned By    Initials Name Provider Type    CG Alex Sargent, PT Student PT Student         Time Calculation:   PT Charges     Row Name 03/12/19 1307             Time Calculation    Start Time  1135  (Pended)   -CG      Stop Time  1154  (Pended)   -CG      Time Calculation (min)  19 min  (Pended)   -CG      PT Received On  03/12/19  (Pended)   -CG      PT - Next Appointment  03/13/19  (Pended)   -CG      PT Goal Re-Cert Due Date  03/19/19  (Pended)   -CG         Time Calculation- PT    Total Timed Code Minutes- PT  8 minute(s)  (Pended)   -CG        User Key  (r) = Recorded By, (t) = Taken By, (c) = Cosigned By    Initials Name Provider Type    CG Alex Sargent, PT Student PT Student        Therapy Suggested Charges     Code   Minutes Charges    None           Therapy Charges for Today     Code Description Service Date Service Provider Modifiers Qty    23224179237 HC PT EVAL MOD COMPLEXITY 2 3/12/2019 Alex Sargent, PT Student GP 1    21076741448 HC PT THER PROC EA 15 MIN 3/12/2019 Alex Sargent, PT Student GP 1    92579576658 HC PT THER SUPP EA 15 MIN 3/12/2019 Alex Sargent, PT Student GP 1          PT G-Codes  Outcome Measure Options: (P) AM-PAC 6 Clicks Basic Mobility (PT)  AM-PAC 6 Clicks Score: (P) 14      Alex  [place], PT Student  3/12/2019

## 2019-03-12 NOTE — CONSULTS
Malnutrition Severity Assessment    Patient Name:  Jamel Rojas  YOB: 1955  MRN: [id number]  Admit Date:  3/11/2019    Patient meets criteria for : Severe malnutrition    Comments:  Pt exhibits hollowing/scooping of temporal region, prominent & protruding clavicle bone, depression between ribs and thigh is prominent. These findings are suggestive of muscle wasting and loss of subcutaneous fat. Also with poor po intake and recent 20lb weight loss. See assessment for details.    Malnutrition Type: Acute Illness/Injury Malnutrition     Malnutrition Type (last 8 hours)      Malnutrition Severity Assessment     Row Name 03/12/19 1306       Malnutrition Severity Assessment    Malnutrition Type  Acute Illness/Injury Malnutrition    Row Name 03/12/19 1306       Physical Signs of Malnutrition (Acute)    Muscle Wasting  Moderate    Fat Loss  Moderate    Row Name 03/12/19 1306       Weight Status (Acute)    BMI  Mod (<17)    %IBW  Mod (<80%)    Weight Loss  Severe (>7.5% / 3 mo)    Row Name 03/12/19 1306       Energy Intake Status (Acute)    Energy Intake  Severe (< or equal to 50% / > or equal to 5d)    Row Name 03/12/19 1306       Criteria Met (Must meet criteria for severity in at least 2 of these categories: M Wasting, Fat Loss, Fluid, Secondary Signs, Wt. Status, Intake)    Patient meets criteria for   Severe malnutrition          Electronically signed by:  Alexa Maldonado RD  03/12/19 1:13 PM

## 2019-03-12 NOTE — CONSULTS
Adult Nutrition  Assessment/PES    Patient Name:  Jamel Rojas  YOB: 1955  MRN: [id number]  Admit Date:  3/11/2019    Assessment Date:  3/12/2019    Comments:  Nutrition assessment complete. Pt with aproximately 20lb weight loss in the past 2-3 months. Poor po intake. Aphasic. Seemed to understand what I was discussing with him and asking him.  Currently NPO.  Await initiation of po diet and intake. Will follow.     Reason for Assessment     Row Name 03/12/19 1258          Reason for Assessment    Reason For Assessment  identified at risk by screening criteria;nurse/nurse practitioner consult     Diagnosis  neurologic conditions Brain Mass w/ brain compression, AMS, UTI     Identified At Risk by Screening Criteria  MST SCORE 2+         Nutrition/Diet History     Row Name 03/12/19 1259          Nutrition/Diet History    Factors Affecting Nutritional Intake  other (see comments);impaired cognitive status/motor control Expressive aphasia          Anthropometrics     Row Name 03/12/19 1259 03/12/19 0700       Anthropometrics    Weight  --  49.9 kg (110 lb 0.2 oz)       Admit Weight    Admit Weight  49.9 kg (110 lb 0.2 oz)  --       Usual Body Weight (UBW)    Weight Loss  unintentional  --    Weight Loss Time Frame  20lb loss in 2-3 months  --       Body Mass Index (BMI)    BMI Assessment  BMI 16-16.9: protein-energy malnutrition grade II  --        Labs/Tests/Procedures/Meds     Row Name 03/12/19 1300          Labs/Procedures/Meds    Lab Results Reviewed  reviewed, pertinent     Lab Results Comments  Na, Tbili, glu        Diagnostic Tests/Procedures    Diagnostic Test/Procedure Reviewed  reviewed, pertinent     Diagnostic Test/Procedures Comments  CT head        Medications    Pertinent Medications Reviewed  reviewed, pertinent     Pertinent Medications Comments  insulin, keppra, laxatives, ivf         Physical Findings     Row Name 03/12/19 1301          Physical Findings    Overall Physical  Appearance  loss of subcutaneous fat;loss of muscle mass;generalized wasting;underweight     Skin  other (see comments) intact- mottled, some purple, red areas         Estimated/Assessed Needs     Row Name 03/12/19 1309          Estimated/Assessed Needs    Additional Documentation  Calorie Requirements (Group)        Calorie Requirements    Estimated Calorie Requirement Comment  1750 kcals (35/kg), 75g pro (1.5/kg), 1750cc fluid         Nutrition Prescription Ordered     Row Name 03/12/19 1302          Nutrition Prescription PO    Current PO Diet  NPO         Evaluation of Received Nutrient/Fluid Intake     Row Name 03/12/19 1303          Fluid Intake Evaluation    IV Fluid (mL)  1200           Malnutrition Severity Assessment     Row Name 03/12/19 1306          Malnutrition Severity Assessment    Malnutrition Type  Acute Illness/Injury Malnutrition        Physical Signs of Malnutrition (Acute)    Muscle Wasting  Moderate     Fat Loss  Moderate        Weight Status (Acute)    BMI  Mod (<17)     %IBW  Mod (<80%)     Weight Loss  Severe (>7.5% / 3 mo)        Energy Intake Status (Acute)    Energy Intake  Severe (< or equal to 50% / > or equal to 5d)        Criteria Met (Must meet criteria for severity in at least 2 of these categories: M Wasting, Fat Loss, Fluid, Secondary Signs, Wt. Status, Intake)    Patient meets criteria for   Severe malnutrition         Problem/Interventions:  Problem 1     Row Name 03/12/19 1306          Nutrition Diagnoses Problem 1    Problem 1  Malnutrition     Etiology (related to)  Medical Diagnosis     Neurological  Brain tumor     Signs/Symptoms (evidenced by)  BMI;% IBW;Report of Mnimal PO Intake;Unintended Weight Change     BMI  16 - 16.9     Percent (%) IBW  72 %     Unintended Weight Change  Loss     Number of Pounds Lost  20lb     Weight loss time period  2-3 months         Intervention Goal     Row Name 03/12/19 1309          Intervention Goal    General  Maintain nutrition;Meet  nutritional needs for age/condition;Reduce/improve symptoms;Disease management/therapy     PO  Tolerate PO;Initiate feeding     Weight  No significant weight loss         Nutrition Intervention     Row Name 03/12/19 1308          Nutrition Intervention    RD/Tech Action  Follow Tx progress;Await begin PO;Care plan reviewd           Education/Evaluation     Row Name 03/12/19 1308          Education    Education  Will Instruct as appropriate        Monitor/Evaluation    Monitor  Weight;Skin status;Per protocol;I&O;Symptoms;Pertinent labs           Electronically signed by:  Alexa Maldonado RD  03/12/19 1:10 PM

## 2019-03-12 NOTE — PLAN OF CARE
Problem: Patient Care Overview  Goal: Plan of Care Review  Outcome: Ongoing (interventions implemented as appropriate)   03/12/19 1301   Coping/Psychosocial   Plan of Care Reviewed With patient   OTHER   Outcome Summary Pt presents to PT for evaluation w/ decreased functional mobility, altered gait, and activity endurance secondary to decreased balance and strength in R UE and R LE d/t L frontal lobe mass. Pt completed bed mobility w/ min A and took few steps w/ Mod Ax2. PT will follow to address strength, transfers, balance and functional mobility deficits.

## 2019-03-13 NOTE — PROGRESS NOTES
Dr. TOMASZ Simeon    [facility] INTENSIVE CARE    3/13/2019    Patient ID:  Name:  Jamel Rojas  MRN:  [id number]  1955  63 y.o.  male            CC/Reason for visit: Left frontal brain mass, a aphasia    HPI: The patient is still confused.  Shakes his head no when I asked him if he has headache.    ROS: No seizures.  Still confused    Vitals:  Vitals:    03/13/19 0605 03/13/19 0705 03/13/19 0718 03/13/19 1117   BP: 129/85 119/77     Pulse: 53 69     Resp:       Temp:   98.5 °F (36.9 °C) 98.2 °F (36.8 °C)   TempSrc:   Oral    SpO2: 99% 97%     Weight:               Body mass index is 16.73 kg/m².    Intake/Output Summary (Last 24 hours) at 3/13/2019 1124  Last data filed at 3/12/2019 1705  Gross per 24 hour   Intake 463 ml   Output --   Net 463 ml       Exam:  GEN:  No distress  Awake, alert x1.  Follows a few simple commands, but has significant dysmetria in right upper extremity and right lower extremity.  EYES:   anicteric sclera bilat  ENT:    External ears/nose normal, OP clear  NECK:  Supple, midline trachea  LUNGS: Clear breath sounds bilat, nonlabored breathing  CV:  Normal S1S2, without murmur, no edema  ABD:  Non tender, no enlarged liver or masses  EXT:  No cyanosis or clubbing    Scheduled meds:    dexamethasone 4 mg Intravenous Q8H   influenza vaccine 0.5 mL Intramuscular Once   insulin lispro 0-7 Units Subcutaneous Q6H   levETIRAcetam 1,000 mg Intravenous Q12H   sennosides-docusate sodium 2 tablet Oral Nightly     IV meds:                        dextrose 5 % and sodium chloride 0.45 % 50 mL/hr Last Rate: 50 mL/hr (03/12/19 1109)       Data Review:   I reviewed the patient's medications and new clinical results.  Lab Results   Component Value Date    CALCIUM 9.7 03/13/2019     Results from last 7 days   Lab Units 03/13/19  0602 03/12/19  0608 03/11/19  1446   SODIUM mmol/L 151* 152* 148*   POTASSIUM mmol/L 3.9 4.0 4.1   CHLORIDE mmol/L 114* 113* 103   CO2 mmol/L 24.8 24.3 21.5*    BUN mg/dL 19 29* 28*   CREATININE mg/dL 0.85 0.89 1.18   CALCIUM mg/dL 9.7 9.6 10.6*   BILIRUBIN mg/dL 1.5* 1.6* 2.6*   ALK PHOS U/L 68 78 91   ALT (SGPT) U/L 16 16 20   AST (SGOT) U/L 17 19 23   GLUCOSE mg/dL 153* 92 89   WBC 10*3/mm3 11.99* 8.67 10.41   HEMOGLOBIN g/dL 14.8 16.2 18.0*   PLATELETS 10*3/mm3 181 216 244   INR  1.13*  --   --              ASSESSMENT:   Altered mental status  Brain mass  Brain compression  Dehydration  Hypernatremia      PLAN:  The patient has a large brain mass.  Awaiting for final decision from neurosurgery whether this patient will even have any surgical procedure..  Start metastatic workup.          Alek Simeon MD  3/13/2019

## 2019-03-13 NOTE — PLAN OF CARE
Problem: Patient Care Overview  Goal: Plan of Care Review   03/13/19 1620   Coping/Psychosocial   Plan of Care Reviewed With patient   OTHER   Outcome Summary Improved tolerance to functional activity this day with an increase in gait distance and decreased assist required for overall functional mobility.    Plan of Care Review   Progress improving

## 2019-03-13 NOTE — PLAN OF CARE
Problem: Skin Injury Risk (Adult)  Goal: Identify Related Risk Factors and Signs and Symptoms  Outcome: Ongoing (interventions implemented as appropriate)   03/13/19 1439   Skin Injury Risk (Adult)   Related Risk Factors (Skin Injury Risk) cognitive impairment;critical care admission;nutritional deficiencies;mobility impaired     Goal: Skin Health and Integrity  Outcome: Ongoing (interventions implemented as appropriate)   03/13/19 1439   Skin Injury Risk (Adult)   Skin Health and Integrity making progress toward outcome       Problem: Patient Care Overview  Goal: Plan of Care Review  Outcome: Ongoing (interventions implemented as appropriate)   03/13/19 1439   Coping/Psychosocial   Plan of Care Reviewed With patient   OTHER   Outcome Summary CT of chest and abdomen completed. Patient alert to self and place. No complaints. VSS. Sister at bedside and given info in regards to obtaining POA per CCP recomendations.   Cont. to monitor.   Plan of Care Review   Progress no change

## 2019-03-13 NOTE — PROGRESS NOTES
Discharge Planning Assessment  [facility]     Patient Name: Jamel Rojas  MRN: [id number]  Today's Date: 3/13/2019    Admit Date: 3/11/2019    Discharge Needs Assessment     Row Name 03/13/19 1237       Living Environment    Lives With  alone    Current Living Arrangements  home/apartment/condo    Primary Care Provided by  self    Provides Primary Care For  no one    Family Caregiver if Needed  sibling(s)    Family Caregiver Names  sister Jina states his only family is her, her dtr isidro and her son.     Quality of Family Relationships  helpful;supportive    Able to Return to Prior Arrangements  other (see comments)    Living Arrangement Comments  unsure of DC needs at this time       Resource/Environmental Concerns    Resource/Environmental Concerns  none    Transportation Concerns  car, none       Transition Planning    Patient/Family Anticipates Transition to  inpatient rehabilitation facility;home with help/services    Patient/Family Anticipated Services at Transition  home health care;skilled nursing    Transportation Anticipated  family or friend will provide       Discharge Needs Assessment    Readmission Within the Last 30 Days  no previous admission in last 30 days    Concerns to be Addressed  decision making;adjustment to diagnosis/illness;discharge planning;home safety;legal    Concerns Comments  pt and his sister provided with [facility] for information re: POA. Staff RN states she will put the advanced care planning order in for any further needs    Equipment Currently Used at Home  none    Anticipated Changes Related to Illness  inability to care for self    Equipment Needed After Discharge  walker, rolling;oxygen    Outpatient/Agency/Support Group Needs  homecare agency;skilled nursing facility;inpatient rehabilitation facility    Discharge Facility/Level of Care Needs  home with home health;nursing facility, skilled    Offered/Gave Vendor List  no    Patient's Choice of Community  Agency(s)  family only wanted [facility] at this time.    Current Discharge Risk  chronically ill;dependent with mobility/activities of daily living;cognitively impaired;legal concerns;physical impairment        Discharge Plan     Row Name 03/13/19 1241       Plan    Plan  CCP to follow for needs    Patient/Family in Agreement with Plan  yes spoke with pt and his sister Jina at bedside    Plan Comments  Introduced self/explained role of CCP. Face sheet data updated. Prior to hospital stay pt was IADLs. Denies use of DME/SNF. Sister states pt had colon CA about 6 yrs ago and used [facility] for ostomy care. Ostomy has since been reversed. DC needs to be determined at this time. Pt/sister had questions re: POA. Provided them with [facility] to review the legal information there and staff RN states she will put in the advanced care planning consult in [employer]. CCP to follow................JW        Destination      No service coordination in this encounter.      Durable Medical Equipment      No service coordination in this encounter.      Dialysis/Infusion      No service coordination in this encounter.      Home Medical Care      No service coordination in this encounter.      Community Resources      No service coordination in this encounter.          Demographic Summary     Row Name 03/13/19 1236       General Information    Admission Type  inpatient    Arrived From  home    Required Notices Provided  -- n/a    Referral Source  admission list    Reason for Consult  discharge planning    Preferred Language  English     Used During This Interaction  no       Contact Information    Permission Granted to Share Info With  ;family/designee        Functional Status     Row Name 03/13/19 1237       Functional Status    Usual Activity Tolerance  fair    Current Activity Tolerance  poor       Functional Status, IADL    Medications  independent    Meal Preparation  independent    Housekeeping   independent    Laundry  independent    Shopping  independent       Mental Status    General Appearance WDL  ex;appearance    General Appearance  unkempt        Psychosocial    No documentation.       Abuse/Neglect    No documentation.       Legal    No documentation.       Substance Abuse    No documentation.       Patient Forms    No documentation.           Nelda Madsen RN

## 2019-03-13 NOTE — PROGRESS NOTES
[facility] PROGRESS NOTE    PATIENT IDENTIFICATION:   Name:  Jamel Rojas      MRN:  [id number]     63 y.o.  male               CC: Left frontal brain mass, a aphasia      Subjective     Interval History: has no complaint of headache nausea vomiting numbness tingling etc.  His speech is improved however his he is having a great toe trouble expressing himself.  He does seem to understand and follows commands briskly in the upper and lower extremities bilaterally.    Objective     Vital signs in last 24 hours:  Temp:  [97.4 °F (36.3 °C)-98.5 °F (36.9 °C)] 98.5 °F (36.9 °C)  Heart Rate:  [45-74] 69  BP: ()/() 119/77  ICP ranges-    Intake/Output last 3 shifts:  I/O last 3 completed shifts:  In: 1879 [I.V.:1829; IV Piggyback:50]  Out: -     Intake/Output this shift:  No intake/output data recorded.      Physical Exam:  General:   Awake, alert    CN III IV VI: Extraocular movements are full , PERRL   CN V: Normal facial sensation and strength of muscles of mastication.  CN VII: Facial movements are symmetric. No weakness.      Motor: Right upper and lower extremity substantial weakness -unchanged on my exam  Reflexes: 2+ in the upper and lower extremities. Plantar responses are on the left and upgoing on the right.  Sensation: Some neglect on the right side  Station and Gait: Unable to ambulate  Coordination: Finger to nose test shows no dysmetria.  Rapid alternating movements are normal.  Heel to shin normal.  All of this is on the left side  Extremities:  Patient is wearing DESMOND and SCD bilaterally    LABS:    Lab Results (last 24 hours)     Procedure Component Value Units Date/Time    POC Glucose Once [[id number]]  (Normal) Collected:  03/12/19 1105    Specimen:  Blood Updated:  03/12/19 1107     Glucose 123 mg/dL     POC Glucose Once [[id number]]  (Abnormal) Collected:  03/12/19 1751    Specimen:  Blood Updated:  03/12/19 1752     Glucose 146 mg/dL     POC Glucose Once  [[id number]]  (Abnormal) Collected:  03/13/19 0008    Specimen:  Blood Updated:  03/13/19 0009     Glucose 139 mg/dL     CBC & Differential [[id number]] Collected:  03/13/19 0602    Specimen:  Blood Updated:  03/13/19 0619    Narrative:       The following orders were created for panel order CBC & Differential.  Procedure                               Abnormality         Status                     ---------                               -----------         ------                     CBC Auto Differential[[id number]]        Abnormal            Final result                 Please view results for these tests on the individual orders.    CBC Auto Differential [[id number]]  (Abnormal) Collected:  03/13/19 0602    Specimen:  Blood from Arm, Right Updated:  03/13/19 0619     WBC 11.99 10*3/mm3      RBC 4.68 10*6/mm3      Hemoglobin 14.8 g/dL      Hematocrit 44.0 %      MCV 94.0 fL      MCH 31.6 pg      MCHC 33.6 g/dL      RDW 13.3 %      RDW-SD 46.1 fl      MPV 9.7 fL      Platelets 181 10*3/mm3      Neutrophil % 92.7 %      Lymphocyte % 3.3 %      Monocyte % 3.3 %      Eosinophil % 0.0 %      Basophil % 0.1 %      Immature Grans % 0.6 %      Neutrophils, Absolute 11.13 10*3/mm3      Lymphocytes, Absolute 0.39 10*3/mm3      Monocytes, Absolute 0.39 10*3/mm3      Eosinophils, Absolute 0.00 10*3/mm3      Basophils, Absolute 0.01 10*3/mm3      Immature Grans, Absolute 0.07 10*3/mm3      nRBC 0.0 /100 WBC     Comprehensive Metabolic Panel [[id number]]  (Abnormal) Collected:  03/13/19 0602    Specimen:  Blood from Arm, Right Updated:  03/13/19 0638     Glucose 153 mg/dL      BUN 19 mg/dL      Creatinine 0.85 mg/dL      Sodium 151 mmol/L      Potassium 3.9 mmol/L      Chloride 114 mmol/L      CO2 24.8 mmol/L      Calcium 9.7 mg/dL      Total Protein 5.9 g/dL      Albumin 3.80 g/dL      ALT (SGPT) 16 U/L      AST (SGOT) 17 U/L      Alkaline Phosphatase 68 U/L      Total Bilirubin 1.5 mg/dL      eGFR Non African Amer 92 mL/min/1.73       Globulin 2.1 gm/dL      A/G Ratio 1.8 g/dL      BUN/Creatinine Ratio 22.4     Anion Gap 12.2 mmol/L     Narrative:       GFR Normal >60  Chronic Kidney Disease <60  Kidney Failure <15    Protime-INR [[id number]]  (Abnormal) Collected:  03/13/19 0602    Specimen:  Blood from Arm, Right Updated:  03/13/19 0631     Protime 14.3 Seconds      INR 1.13    POC Glucose Once [[id number]]  (Normal) Collected:  03/13/19 0622    Specimen:  Blood Updated:  03/13/19 0624     Glucose 126 mg/dL           IMAGING STUDIES:    I personally viewed and interpreted the patient's MRI.  This demonstrates a partially calcified lesion that has heterogeneously enhancing measuring 6-1/2 x 5-1/2 x 6 cm.  No other lesions are seen.      Meds reviewed/changed: Yes    Current Facility-Administered Medications   Medication Dose Route Frequency Provider Last Rate Last Dose   • acetaminophen (TYLENOL) tablet 650 mg  650 mg Oral Q4H PRN Fernando Chaidez MD        Or   • acetaminophen (TYLENOL) suppository 650 mg  650 mg Rectal Q4H PRN Fernando Chaidez MD       • dexamethasone sodium phosphate injection 4 mg  4 mg Intravenous Q8H Heidy Burgos APRN   4 mg at 03/13/19 0245   • dextrose (D50W) 25 g/ 50mL Intravenous Solution 25 g  25 g Intravenous Q15 Min PRN Fernando Chaidez MD       • dextrose (GLUTOSE) oral gel 15 g  15 g Oral Q15 Min PRN Fernando Chaidez MD       • dextrose 5 % and sodium chloride 0.45 % infusion  50 mL/hr Intravenous Continuous Alek Simeon MD 50 mL/hr at 03/12/19 1109 50 mL/hr at 03/12/19 1109   • glucagon (human recombinant) (GLUCAGEN DIAGNOSTIC) injection 1 mg  1 mg Subcutaneous PRN Fernando Chaidez MD       • Influenza Vac Subunit Quad (FLUCELVAX) injection 0.5 mL  0.5 mL Intramuscular Once Alek Simeon MD       • insulin lispro (humaLOG) injection 0-7 Units  0-7 Units Subcutaneous Q6H Fernando Chaidez MD   2 Units at 03/12/19 0000   • ipratropium-albuterol (DUO-NEB) nebulizer solution 3 mL  3 mL Nebulization Q2H PRN  Fernando Chaidez MD       • levETIRAcetam in NaCl 0.75% (KEPPRA) IVPB 1,000 mg  1,000 mg Intravenous Q12H Alek Simeon MD   1,000 mg at 03/12/19 2010   • ondansetron (ZOFRAN) tablet 4 mg  4 mg Oral Q6H PRN Fernando Chaidez MD        Or   • ondansetron ODT (ZOFRAN-ODT) disintegrating tablet 4 mg  4 mg Oral Q6H PRN Fernando Chaidez MD        Or   • ondansetron (ZOFRAN) injection 4 mg  4 mg Intravenous Q6H PRN Fernando Chaidez MD       • potassium chloride (MICRO-K) CR capsule 40 mEq  40 mEq Oral PRN Fernando Chaidez MD        Or   • potassium chloride (KLOR-CON) packet 40 mEq  40 mEq Oral PRN Fernando Chaidez MD        Or   • potassium chloride 10 mEq in 100 mL IVPB  10 mEq Intravenous Q1H PRN Fernando Chaidez MD       • sennosides-docusate sodium (SENOKOT-S) 8.6-50 MG tablet 2 tablet  2 tablet Oral Nightly Fernando Chaidez MD       • sodium chloride 0.9 % flush 3-10 mL  3-10 mL Intravenous PRN Fernando Chaidez MD           Assessment/Plan     ASSESSMENT:      Altered mental status  Brain mass    PLAN: Metastatic workup    I discussed the patients findings and my recommendations with patient, family, primary care team and consulting provider       LOS: 2 days       Fredy Field MD  3/13/2019  8:15 AM

## 2019-03-13 NOTE — THERAPY TREATMENT NOTE
"Acute Care - Physical Therapy Treatment Note  [facility]     Patient Name: Jamel Rojas  : 1955  MRN: [id number]  Today's Date: 3/13/2019  Onset of Illness/Injury or Date of Surgery: 19          Admit Date: 3/11/2019    Visit Dx:    ICD-10-CM ICD-9-CM   1. Altered mental status, unspecified altered mental status type R41.82 780.97   2. Brain mass G93.9 348.9   3. Difficulty walking R26.2 719.7     Patient Active Problem List   Diagnosis   • History of rectal cancer   • Altered mental status       Therapy Treatment    Rehabilitation Treatment Summary     Row Name 19 1615             Treatment Time/Intention    Discipline  physical therapist  -MS      Document Type  therapy note (daily note)  -MS      Subjective Information  no complaints  -MS      Patient Effort  good  -MS      Comment  Pt. reports \"yes\" to work with P.T. and \"no\" when asked about pain or fatigue. Otherwise, pt. non-verbal throughout therapy session.    -MS      Existing Precautions/Restrictions  fall  (Significant)   -MS      Recorded by [MS] Guido Grant, PT 19 1620      Row Name 19 1615             Cognitive Assessment/Intervention- PT/OT    Follows Commands (Cognition)  follows one step commands;over 90% accuracy  -MS      Personal Safety Interventions  fall prevention program maintained;gait belt;nonskid shoes/slippers when out of bed;supervised activity  -MS      Recorded by [MS] Guido Grant, PT 19 1620      Row Name 19 1615             Bed Mobility Assessment/Treatment    Bed Mobility Assessment/Treatment  supine-sit;sit-supine  -MS      Supine-Sit Braxton (Bed Mobility)  contact guard  -MS      Sit-Supine Braxton (Bed Mobility)  minimum assist (75% patient effort) Assist with pt.'s Bilateral L.E.'s  -MS      Comment (Bed Mobility)  Once sitting EOB, pt. requires CGA x 1 for static sitting balance and Min. assist x 1 for Dynamic sitting balance.  -MS      Recorded by " [MS] Sunshine Grantalana BARAJAS, PT 03/13/19 1620      Row Name 03/13/19 1615             Sit-Stand Transfer    Sit-Stand Ceiba (Transfers)  minimum assist (75% patient effort);2 person assist  -MS      Recorded by [MS] Juanita Guido BARAJAS, PT 03/13/19 1620      Row Name 03/13/19 1615             Stand-Sit Transfer    Stand-Sit [place]iba (Transfers)  minimum assist (75% patient effort);2 person assist  -MS      Recorded by [MS] Grant Guido BARAJAS, PT 03/13/19 1620      Row Name 03/13/19 1615             Gait/Stairs Assessment/Training    Ceiba Level (Gait)  minimum assist (75% patient effort);2 person assist  -MS      Assistive Device (Gait)  -- HHA x 1  -MS      Distance in Feet (Gait)  30 feet  -MS      Pattern (Gait)  step-through  -MS      Deviations/Abnormal Patterns (Gait)  base of support, narrow;stride length decreased  -MS      Comment (Gait/Stairs)  Mild leaning to his Right side throughout therapy session but otherwise, no overt losses of balance when upright.  -MS      Recorded by [MS] Grant Guido BARAJAS, PT 03/13/19 1620      Row Name 03/13/19 1615             Therapeutic Exercise    Comment (Therapeutic Exercise)  BLE ther. ex. program x 10 reps completed (LAQ's, Hip Flexion)  -MS      Recorded by [MS] Grant Guido BARAJAS, PT 03/13/19 1620      Row Name 03/13/19 1615             Positioning and Restraints    Pre-Treatment Position  in bed  -MS      Post Treatment Position  bed  -MS      In Bed  notified nsg;supine;call light within reach;encouraged to call for assist;exit alarm on All lines intact.   -MS      Recorded by [MS] Guido Grant, PT 03/13/19 1620      Row Name 03/13/19 1615             Pain Scale: Numbers Pre/Post-Treatment    Pain Scale: Numbers, Pretreatment  0/10 - no pain No verbal/visual signs of pain  -MS      Pain Scale: Numbers, Post-Treatment  0/10 - no pain  -MS      Recorded by [MS] Guido Grant, PT 03/13/19 1620        User Key  (r) = Recorded By, (t) = Taken By, (c) =  Cosigned By    Initials Name Effective Dates Discipline    Guido Borja, PT 04/03/18 -  PT                   Physical Therapy Education     Title: PT OT SLP Therapies (Done)     Topic: Physical Therapy (Done)     Point: Mobility training (Done)     Learning Progress Summary           Patient Acceptance, E,D, VU,NR by MS at 3/13/2019  4:20 PM    Acceptance, E,TB,D, NR by CG at 3/12/2019  1:01 PM                   Point: Home exercise program (Done)     Learning Progress Summary           Patient Acceptance, E,D, VU,NR by MS at 3/13/2019  4:20 PM    Acceptance, E,TB,D, NR by CG at 3/12/2019  1:01 PM                   Point: Body mechanics (Done)     Learning Progress Summary           Patient Acceptance, E,D, VU,NR by MS at 3/13/2019  4:20 PM    Acceptance, E,TB,D, NR by CG at 3/12/2019  1:01 PM                   Point: Precautions (Done)     Learning Progress Summary           Patient Acceptance, E,D, VU,NR by MS at 3/13/2019  4:20 PM                               User Key     Initials Effective Dates Name Provider Type Discipline    MS 04/03/18 -  Guido Grant KARL, PT Physical Therapist PT    CG 02/04/19 -  Alex Sargent, RK Student PT Student PT                PT Recommendation and Plan     Plan of Care Reviewed With: patient  Progress: improving  Outcome Summary: Improved tolerance to functional activity this day with an increase in gait distance and decreased assist required for overall functional mobility.   Outcome Measures     Row Name 03/13/19 1600 03/12/19 1300          How much help from another person do you currently need...    Turning from your back to your side while in flat bed without using bedrails?  3  -MS  3  -CH (r) CG (t) CH (c)     Moving from lying on back to sitting on the side of a flat bed without bedrails?  3  -MS  3  -CH (r) CG (t) CH (c)     Moving to and from a bed to a chair (including a wheelchair)?  3  -MS  3  -CH (r) CG (t) CH (c)     Standing up from a chair using  your arms (e.g., wheelchair, bedside chair)?  2  -MS  2  -CH (r) CG (t) CH (c)     Climbing 3-5 steps with a railing?  2  -MS  1  -CH (r) CG (t) CH (c)     To walk in hospital room?  2  -MS  2  -CH (r) CG (t) CH (c)     AM-PAC 6 Clicks Score  15  -MS  14  -CH (r) CG (t)        Functional Assessment    Outcome Measure Options  AM-PAC 6 Clicks Basic Mobility (PT)  -MS  AM-PAC 6 Clicks Basic Mobility (PT)  -CH (r) CG (t) CH (c)       User Key  (r) = Recorded By, (t) = Taken By, (c) = Cosigned By    Initials Name Provider Type     Anya Flores, PT Physical Therapist    Guido Borja, PT Physical Therapist    CG Alex Sargent, PT Student PT Student         Time Calculation:   PT Charges     Row Name 03/13/19 1622             Time Calculation    Start Time  1427  -MS      Stop Time  1440  -MS      Time Calculation (min)  13 min  -MS      PT Received On  03/13/19  -MS      PT - Next Appointment  03/14/19  -MS         Time Calculation- PT    Total Timed Code Minutes- PT  11 minute(s)  -MS        User Key  (r) = Recorded By, (t) = Taken By, (c) = Cosigned By    Initials Name Provider Type    Guido Borja, PT Physical Therapist        Therapy Suggested Charges     Code   Minutes Charges    None           Therapy Charges for Today     Code Description Service Date Service Provider Modifiers Qty    11878295170 HC PT THER PROC EA 15 MIN 3/13/2019 Guido Grant, PT GP 1    85744794430 HC PT THER SUPP EA 15 MIN 3/13/2019 Guido Grant, PT GP 1          PT G-Codes  Outcome Measure Options: AM-PAC 6 Clicks Basic Mobility (PT)  AM-PAC 6 Clicks Score: 15    Guido Grant, PT  3/13/2019

## 2019-03-14 PROBLEM — G93.89 BRAIN MASS: Status: ACTIVE | Noted: 2019-01-01

## 2019-03-14 NOTE — PLAN OF CARE
Problem: Nutrition, Imbalanced: Inadequate Oral Intake (Adult)  Intervention: Promote/Optimize Nutrition   03/14/19 0959   Nutrition Interventions   Oral Nutrition Promotion nutritional therapy counseling provided;calorie dense liquids provided;calorie dense foods provided, start boost plus       Goal: Improved Oral Intake  Outcome: Ongoing (interventions implemented as appropriate)    Goal: Prevent Further Weight Loss  Outcome: Ongoing (interventions implemented as appropriate)

## 2019-03-14 NOTE — THERAPY TREATMENT NOTE
Acute Care - Physical Therapy Treatment Note  [facility]     Patient Name: Jamel Rojas  : 1955  MRN: [id number]  Today's Date: 3/14/2019  Onset of Illness/Injury or Date of Surgery: 19          Admit Date: 3/11/2019    Visit Dx:    ICD-10-CM ICD-9-CM   1. Altered mental status, unspecified altered mental status type R41.82 780.97   2. Brain mass G93.9 348.9   3. Difficulty walking R26.2 719.7     Patient Active Problem List   Diagnosis   • History of rectal cancer   • Altered mental status   • Brain mass       Therapy Treatment    Rehabilitation Treatment Summary     Row Name 19 1516             Treatment Time/Intention    Discipline  physical therapist  -      Document Type  therapy note (daily note)  -      Subjective Information  no complaints  -      Mode of Treatment  physical therapy  -      Patient/Family Observations  pt supine in bed, no acute distress noted at rest, nephew initially present but left shortly after PT entered room  -      Patient Effort  good  -      Existing Precautions/Restrictions  fall  -CH      Recorded by [] Anya Flores, PT 19 1554      Row Name 19 151             Cognitive Assessment/Intervention    Additional Documentation  Cognitive Assessment/Intervention (Group)  -      Recorded by [] Anya Flores, PT 19 155      Row Name 19 1516             Cognitive Assessment/Intervention- PT/OT    Orientation Status (Cognition)  oriented to;person pt with aphasia  -      Follows Commands (Cognition)  follows one step commands;50-74% accuracy  -      Personal Safety Interventions  fall prevention program maintained;gait belt;nonskid shoes/slippers when out of bed  -      Recorded by [] Anya Flores, PT 19 155      Row Name 19 1516             Bed Mobility Assessment/Treatment    Supine-Sit [place] (Bed Mobility)  verbal cues;nonverbal cues (demo/gesture);contact guard  -       Sit-Supine [place] (Bed Mobility)  verbal cues;nonverbal cues (demo/gesture);minimum assist (75% patient effort);2 person assist  -CH      Recorded by [] Anya Flores, PT 03/14/19 1554      Row Name 03/14/19 1516             Sit-Stand Transfer    Sit-Stand [place] (Transfers)  verbal cues;nonverbal cues (demo/gesture);minimum assist (75% patient effort);2 person assist  -      Assistive Device (Sit-Stand Transfers)  -- HHA  -CH      Recorded by [] Anya Flores, PT 03/14/19 1554      Row Name 03/14/19 1516             Stand-Sit Transfer    Stand-Sit [place] (Transfers)  verbal cues;nonverbal cues (demo/gesture);minimum assist (75% patient effort);2 person assist  -      Assistive Device (Stand-Sit Transfers)  -- HHA  -CH      Recorded by [] Anya Flores, PT 03/14/19 1554      Row Name 03/14/19 1516             Gait/Stairs Assessment/Training    [place] Level (Gait)  verbal cues;nonverbal cues (demo/gesture);minimum assist (75% patient effort);moderate assist (50% patient effort);2 person assist  -      Assistive Device (Gait)  -- HHA  -CH      Distance in Feet (Gait)  30  -CH      Deviations/Abnormal Patterns (Gait)  gemma decreased;gait speed decreased;stride length decreased;ataxic  -CH      Bilateral Gait Deviations  foot drop/toe drag;forward flexed posture;heel strike decreased;knee buckling, right side;leans right  -CH      Comment (Gait/Stairs)  pt requires assistance at times to advance R foot  -CH      Recorded by [] Anya Flores, PT 03/14/19 1554      Row Name 03/14/19 1516             Therapeutic Exercise    Comment (Therapeutic Exercise)  10 reps AP, LAQ, AROM on L, AAROM on R  -CH      Recorded by [] Anya Flores, PT 03/14/19 1554      Row Name 03/14/19 1516             Positioning and Restraints    Pre-Treatment Position  in bed  -CH      Post Treatment Position  bed  -CH      In Bed  supine;call light within reach;encouraged to call  for assist;exit alarm on;notified nsg  -CH      Recorded by [] Anya Flores, PT 03/14/19 1554      Row Name 03/14/19 1516             Pain Assessment    Additional Documentation  Pain Scale: Numbers Pre/Post-Treatment (Group)  -CH      Recorded by [] Anya Flores, PT 03/14/19 1554      Row Name 03/14/19 1516             Pain Scale: Numbers Pre/Post-Treatment    Pain Scale: Numbers, Pretreatment  0/10 - no pain  -CH      Recorded by [] Anya Flores, PT 03/14/19 1554      Row Name 03/14/19 1516             Plan of Care Review    Plan of Care Reviewed With  patient  -CH      Recorded by [] Anya Flores, PT 03/14/19 1554      Row Name 03/14/19 1516             Outcome Summary/Treatment Plan (PT)    Anticipated Discharge Disposition (PT)  skilled nursing facility;inpatient rehabilitation facility  -CH      Recorded by [] Anya Flores, PT 03/14/19 1555        User Key  (r) = Recorded By, (t) = Taken By, (c) = Cosigned By    Initials Name Effective Dates Discipline     Anya Flores, PT 04/03/18 -  PT                   Physical Therapy Education     Title: PT OT SLP Therapies (Done)     Topic: Physical Therapy (Done)     Point: Mobility training (Done)     Learning Progress Summary           Patient Acceptance, E,TB,D, VU,NR by  at 3/14/2019  3:54 PM    Acceptance, E,D, VU,NR by MS at 3/13/2019  4:20 PM    Acceptance, E,TB,D, NR by CG at 3/12/2019  1:01 PM                   Point: Home exercise program (Done)     Learning Progress Summary           Patient Acceptance, E,TB,D, VU,NR by  at 3/14/2019  3:54 PM    Acceptance, E,D, VU,NR by MS at 3/13/2019  4:20 PM    Acceptance, E,TB,D, NR by CG at 3/12/2019  1:01 PM                   Point: Body mechanics (Done)     Learning Progress Summary           Patient Acceptance, E,TB,D, VU,NR by  at 3/14/2019  3:54 PM    Acceptance, E,D, VU,NR by MS at 3/13/2019  4:20 PM    Acceptance, E,TB,D, NR by CG at 3/12/2019  1:01 PM                    Point: Precautions (Done)     Learning Progress Summary           Patient Acceptance, E,TB,D, VU,NR by  at 3/14/2019  3:54 PM    Acceptance, E,D, VU,NR by MS at 3/13/2019  4:20 PM                               User Key     Initials Effective Dates Name Provider Type Discipline     04/03/18 -  Anya Flores, PT Physical Therapist PT    MS 04/03/18 -  Guido Grant PT Physical Therapist PT     02/04/19 -  Alex Sargent PT Student PT Student PT                PT Recommendation and Plan  Anticipated Discharge Disposition (PT): skilled nursing facility, inpatient rehabilitation facility  Outcome Summary/Treatment Plan (PT)  Anticipated Discharge Disposition (PT): skilled nursing facility, inpatient rehabilitation facility  Plan of Care Reviewed With: patient  Outcome Summary: Pt continues to have R side weakness and decreased coordination, therefore requiring increased assistance with gait and balance. PT will continue to follow to address strength, mobility ,and gait.  Outcome Measures     Row Name 03/14/19 1500 03/13/19 1600 03/12/19 1300       How much help from another person do you currently need...    Turning from your back to your side while in flat bed without using bedrails?  3  -CH  3  -MS  3  -CH (r) CG (t) CH (c)    Moving from lying on back to sitting on the side of a flat bed without bedrails?  3  -CH  3  -MS  3  -CH (r) CG (t) CH (c)    Moving to and from a bed to a chair (including a wheelchair)?  3  -CH  3  -MS  3  -CH (r) CG (t) CH (c)    Standing up from a chair using your arms (e.g., wheelchair, bedside chair)?  3  -CH  2  -MS  2  -CH (r) CG (t) CH (c)    Climbing 3-5 steps with a railing?  2  -CH  2  -MS  1  -CH (r) CG (t) CH (c)    To walk in hospital room?  2  -CH  2  -MS  2  -CH (r) CG (t) CH (c)    AM-PAC 6 Clicks Score  16  -CH  15  -MS  14  -CH (r) CG (t)       Functional Assessment    Outcome Measure Options  AM-PAC 6 Clicks Basic Mobility (PT)  -  AM-PAC 6  Clicks Basic Mobility (PT)  -MS  AM-PAC 6 Clicks Basic Mobility (PT)  -CH (r) CG (t) CH (c)      User Key  (r) = Recorded By, (t) = Taken By, (c) = Cosigned By    Initials Name Provider Type     Anya Flores, PT Physical Therapist    MS JuanitaGuido, PT Physical Therapist    CG Alex Sargent, PT Student PT Student         Time Calculation:   PT Charges     Row Name 03/14/19 1556             Time Calculation    Start Time  1459  -CH      Stop Time  1516  -CH      Time Calculation (min)  17 min  -CH      PT Received On  03/14/19  -      PT - Next Appointment  03/15/19  -         Time Calculation- PT    Total Timed Code Minutes- PT  17 minute(s)  -        User Key  (r) = Recorded By, (t) = Taken By, (c) = Cosigned By    Initials Name Provider Type    Anya Rivera, PT Physical Therapist        Therapy Suggested Charges     Code   Minutes Charges    None           Therapy Charges for Today     Code Description Service Date Service Provider Modifiers Qty    64495060879 HC PT THER PROC EA 15 MIN 3/14/2019 Anya Flores, PT GP 1    18864031549 HC PT THER SUPP EA 15 MIN 3/14/2019 Anya Flores, PT GP 1          PT G-Codes  Outcome Measure Options: AM-PAC 6 Clicks Basic Mobility (PT)  AM-PAC 6 Clicks Score: 16    Anya Flores, PT  3/14/2019

## 2019-03-14 NOTE — PROGRESS NOTES
[facility] PROGRESS NOTE    PATIENT IDENTIFICATION:   Name:  Jamel Rojas      MRN:  [id number]     63 y.o.  male               CC: Brain mass and widely metastatic disease throughout his body      Subjective     Interval History: has no complaint of anything.  He remains dysphasic.  He denies headache or nausea or vomiting    Objective     Vital signs in last 24 hours:  Temp:  [97.6 °F (36.4 °C)-98.4 °F (36.9 °C)] 97.6 °F (36.4 °C)  Heart Rate:  [42-73] 55  Resp:  [16-20] 20  BP: ()/(53-96) 107/82  ICP ranges-    Intake/Output last 3 shifts:  I/O last 3 completed shifts:  In: 696 [P.O.:240; I.V.:456]  Out: 175 [Urine:175]    Intake/Output this shift:  No intake/output data recorded.      Physical Exam:  General:   Awake, alert, oriented to his name.  CN III IV VI: Extraocular movements are full , PERRL   CN V: Normal facial sensation and strength of muscles of mastication.  CN VII: Facial movements are symmetric. No weakness.      Motor: Mild right upper extremity drift  Reflexes: 2+ in the upper and lower extremities. Plantar responses are flexor.  Sensation: Normal      Coordination: Finger to nose test shows no dysmetria.  Rapid alternating movements are normal.  Heel to shin normal.  Extremities:  Patient is wearing DESMOND and SCD bilaterally    LABS:    Lab Results (last 24 hours)     Procedure Component Value Units Date/Time    POC Glucose Once [[id number]]  (Normal) Collected:  03/13/19 1116    Specimen:  Blood Updated:  03/13/19 1119     Glucose 125 mg/dL     POC Glucose Once [[id number]]  (Abnormal) Collected:  03/13/19 1747    Specimen:  Blood Updated:  03/13/19 1749     Glucose 139 mg/dL     POC Glucose Once [[id number]]  (Abnormal) Collected:  03/14/19 0006    Specimen:  Blood Updated:  03/14/19 0024     Glucose 211 mg/dL     POC Glucose Once [[id number]]  (Normal) Collected:  03/14/19 0604    Specimen:  Blood Updated:  03/14/19 0629     Glucose 87 mg/dL     Comprehensive  Metabolic Panel [[id number]]  (Abnormal) Collected:  03/14/19 0617    Specimen:  Blood from Arm, Left Updated:  03/14/19 0652     Glucose 89 mg/dL      BUN 21 mg/dL      Creatinine 0.85 mg/dL      Sodium 152 mmol/L      Potassium 3.9 mmol/L      Chloride 117 mmol/L      CO2 26.1 mmol/L      Calcium 9.6 mg/dL      Total Protein 5.8 g/dL      Albumin 3.80 g/dL      ALT (SGPT) 19 U/L      AST (SGOT) 19 U/L      Alkaline Phosphatase 69 U/L      Total Bilirubin 0.8 mg/dL      eGFR Non African Amer 92 mL/min/1.73      Globulin 2.0 gm/dL      A/G Ratio 1.9 g/dL      BUN/Creatinine Ratio 24.7     Anion Gap 8.9 mmol/L     Narrative:       GFR Normal >60  Chronic Kidney Disease <60  Kidney Failure <15          IMAGING STUDIES:    I personally viewed and interpreted the patient's chart.  I reviewed the CT of the abdomen chest and pelvis as well.  This demonstrates metastatic disease in the chest, pancreas, adrenal glands      Meds reviewed/changed: Yes    Current Facility-Administered Medications   Medication Dose Route Frequency Provider Last Rate Last Dose   • acetaminophen (TYLENOL) tablet 650 mg  650 mg Oral Q4H PRN Fernando Chaidez MD        Or   • acetaminophen (TYLENOL) suppository 650 mg  650 mg Rectal Q4H PRN Fernando Chaidez MD       • dexamethasone sodium phosphate injection 4 mg  4 mg Intravenous Q8H Heidy Burgos APRN   4 mg at 03/14/19 0200   • dextrose (D50W) 25 g/ 50mL Intravenous Solution 25 g  25 g Intravenous Q15 Min PRN Fernando Chaidez MD       • dextrose (GLUTOSE) oral gel 15 g  15 g Oral Q15 Min PRN Fernando Chaidez MD       • dextrose 5 % and sodium chloride 0.45 % infusion  50 mL/hr Intravenous Continuous Alek Simeon MD 50 mL/hr at 03/12/19 1109 50 mL/hr at 03/12/19 1109   • famotidine (PEPCID) injection 20 mg  20 mg Intravenous Daily Alek Simeon MD   20 mg at 03/13/19 1521   • glucagon (human recombinant) (GLUCAGEN DIAGNOSTIC) injection 1 mg  1 mg Subcutaneous PRN Fernando Chaidez MD       •  Influenza Vac Subunit Quad (FLUCELVAX) injection 0.5 mL  0.5 mL Intramuscular Once Alek Simeon MD       • insulin lispro (humaLOG) injection 0-7 Units  0-7 Units Subcutaneous Q6H Fernando Chaidez MD   3 Units at 03/14/19 0045   • ipratropium-albuterol (DUO-NEB) nebulizer solution 3 mL  3 mL Nebulization Q2H PRN Fernando Chaidez MD       • levETIRAcetam in NaCl 0.75% (KEPPRA) IVPB 1,000 mg  1,000 mg Intravenous Q12H Alek Simeon MD   1,000 mg at 03/13/19 2100   • ondansetron (ZOFRAN) tablet 4 mg  4 mg Oral Q6H PRN Fernando Chaidez MD        Or   • ondansetron ODT (ZOFRAN-ODT) disintegrating tablet 4 mg  4 mg Oral Q6H PRN Fernando Chaidez MD        Or   • ondansetron (ZOFRAN) injection 4 mg  4 mg Intravenous Q6H PRN Fernando Chaidez MD       • potassium chloride (MICRO-K) CR capsule 40 mEq  40 mEq Oral PRN Fernando Chaidez MD        Or   • potassium chloride (KLOR-CON) packet 40 mEq  40 mEq Oral PRN Fernando Chaidez MD        Or   • potassium chloride 10 mEq in 100 mL IVPB  10 mEq Intravenous Q1H PRN Fernando Chaidez MD       • sennosides-docusate sodium (SENOKOT-S) 8.6-50 MG tablet 2 tablet  2 tablet Oral Nightly Fernando Chaidez MD       • sodium chloride 0.9 % flush 3-10 mL  3-10 mL Intravenous PRN Fernando Chaidez MD           Assessment/Plan     ASSESSMENT:      Altered mental status    Brain mass  PLAN: Let us get oncology's take on this.  His Karnofsky performance scale is very poor - 30%.  He has not been able to get out to buy food.  He is highly confused.  He is cachectic    I discussed the patients findings and my recommendations with patient, nursing staff and consulting provider  From the neurosurgery standpoint he can go up to the floor.     LOS: 3 days       Fredy Field MD  3/14/2019  8:03 AM

## 2019-03-14 NOTE — CONSULTS
[facility] INITIAL INPATIENT CONSULTATION NOTE    REASON FOR CONSULTATION:    1.  History of locally advanced rectosigmoid cancer.  Previously followed by Dr. Ayala.  He was last seen on 6/17/2015.  He was treated with neoadjuvant chemotherapy and radiation and had a low anterior resection with colorectal anastomosis and diverting loop ileostomy on 4/9/2013.  Pathology showed moderately differentiated adenocarcinoma, numerous matted lymph nodes positive for metastatic adenocarcinoma with the exact number difficult to quantify, 13 additional lymph nodes 1 of which was metastatic adenocarcinoma and one with fibro-xanthomatous reaction suggestive of treatment response.  YpT3, pN2.  He went on to complete 12 cycles of adjuvant FOLFOX chemotherapy with therapy complete as of 12/16/2013.  Tiny nodules on follow-up imaging in the lungs which resolved.  He was last seen on 6/17/2015 with annual follow-up anticipated although it is unclear that he followed up with us.  2.  He had a negative colonoscopy by Dr. Arreola on 5/23/2018.  3.  He developed altered mental status and was admitted to [facility] on 3/11/2019.  Chest x-ray showed a change at the right apex.  CT head on 3/11/2019 followed by MRI of the brain on 3/12/2019 showed a 6.4 x 5.4 x 5.9 cm enhancing mass within the left frontal lobe with considerable surrounding vasogenic edema.  Marked mass-effect upon the left lateral ventricle was noted.  4.  CT imaging of the chest abdomen pelvis on 3/13/2019 shows right upper lobe lesion with calcification measuring 4.3 cm.  This extends within the mediastinum and abuts the trachea and medial wall of the esophagus.  It extends up to the bob.  The lesion abuts the right upper lobe bronchus.  2.5 cm pleural-based lesion at the right upper lobe.  Right lower lobe nodule measures 6 mm.  Right hilar lymphadenopathy measures 2 cm.  2.4 cm precarinal lymph node.  Appears normal.  Inferior right hepatic lobe  tiny hypoattenuating lesion present.  2.1 cm distal pancreas hypoattenuating lesion concerning for metastatic lesion.  Right adrenal mass measuring 3.0 cm.  Normal left adrenal gland.  Possibility of peritoneal carcinomatosis.    HISTORY OF PRESENT ILLNESS:  Jamel Rojas is a 63 y.o. male who we are asked to see today in consultation for probable metastatic cancer.    He has a history of rectosigmoid cancer as discussed above.    He is somewhat reclusive.  His family has limited contact with him.  Apparently he did not go to work since December.  He has not been eating well.  He has not been taking care of his pets.  He called a family member earlier this week and was confused.  His house which is usually very clean was in disarray with feces everywhere.  He was brought to the hospital.  He was found to have a large left frontal brain lesion as well as other evidence for metastatic disease as discussed above.  Dr. Field with neurosurgery is following.      He has right-sided weakness.  He has expressive aphasia.  No headache.  No other pain.    Past Medical History:   Diagnosis Date   • Hepatitis C     TREATED WITH HARVONY   • History of hepatitis B    • Kidney stones    • Rectal cancer (CMS/HCC) 2013       Past Surgical History:   Procedure Laterality Date   • APPENDECTOMY  04/09/2013    Low Anterior Resection with Colorectal anastomisis, diverting loop ileostomy and appendectomy-Dr. Ben Arreola   • COLONOSCOPY N/A 5/23/2018    Procedure: COLONOSCOPY TO CECUM;  Surgeon: Ben Arreola MD;  Location: [facility] ENDOSCOPY;  Service: Gastroenterology   • FLEXIBLE SIGMOIDOSCOPY  03/14/2013    Dr. Ben Arreola   • FLEXIBLE SIGMOIDOSCOPY  01/07/2013    Dr. Ben Arreola   • LOW ANTERIOR BOWEL RESECTION  04/09/2013    Low Anterior Resection with Colorectal anastomisis, diverting loop ileostomy and appendectomy-Dr. Ben Arreola   • MEDIPORT INSERTION, SINGLE Left 01/07/2013    Dr. Ben Arreola   •  MEDIPORT REMOVAL  12/27/2013   • RESECTION SMALL BOWEL / CLOSURE ILEOSTOMY Left 05/21/2013    Dr. Ben Arreola       SOCIAL HISTORY:   reports that he quit smoking about 6 years ago. he has never used smokeless tobacco. He reports that he does not drink alcohol or use drugs.    FAMILY HISTORY:  family history is not on file.    ALLERGIES:  Allergies   Allergen Reactions   • Hydrocodone Rash       MEDICATIONS:  As listed in the electronic medical record.    Review of Systems   Constitutional: Positive for unexpected weight change. Negative for appetite change, chills, fatigue and fever.   HENT: Negative for congestion, dental problem, ear pain, hearing loss, mouth sores, nosebleeds, postnasal drip, rhinorrhea, tinnitus and trouble swallowing.    Eyes: Negative.    Respiratory: Negative for cough, chest tightness, shortness of breath, wheezing and stridor.    Cardiovascular: Negative for chest pain, palpitations and leg swelling.   Gastrointestinal: Negative for abdominal distention, abdominal pain, blood in stool, constipation, diarrhea, nausea and vomiting.   Endocrine: Negative.    Genitourinary: Negative for decreased urine volume, difficulty urinating, dysuria, flank pain, frequency, hematuria, scrotal swelling, testicular pain and urgency.   Musculoskeletal: Negative for arthralgias, back pain and joint swelling.   Allergic/Immunologic: Negative.    Neurological: Positive for speech difficulty and weakness. Negative for dizziness, seizures, syncope, light-headedness, numbness and headaches.   Hematological: Negative for adenopathy. Does not bruise/bleed easily.   Psychiatric/Behavioral: Positive for confusion. Negative for sleep disturbance. The patient is not nervous/anxious.    All other systems reviewed and are negative.      Vitals:    03/14/19 0605 03/14/19 0645 03/14/19 0705 03/14/19 0803   BP: (!) 86/68  107/82 113/75   Pulse: (!) 47 55 55 59   Resp:    14   Temp:    98.5 °F (36.9 °C)   TempSrc:        SpO2: 99% 97% 99% 99%   Weight:           Physical Exam   Constitutional: He appears well-developed and well-nourished. No distress.   HENT:   Head: Normocephalic and atraumatic. Hair is normal.   Mouth/Throat: Oropharynx is clear and moist.   Eyes: Conjunctivae, EOM and lids are normal. Pupils are equal.   Neck: Neck supple. No JVD present. No thyroid mass and no thyromegaly present.   Cardiovascular: Normal rate and regular rhythm. Exam reveals no gallop and no friction rub.   No murmur heard.  Pulmonary/Chest: Effort normal and breath sounds normal. No respiratory distress. He has no wheezes. He has no rales.   Abdominal: Soft. He exhibits no distension and no mass. There is no splenomegaly or hepatomegaly. There is no tenderness. There is no guarding.   Musculoskeletal: Normal range of motion. He exhibits no edema, tenderness or deformity.   Lymphadenopathy:     He has no cervical adenopathy.     He has no axillary adenopathy.        Right: No inguinal and no supraclavicular adenopathy present.        Left: No inguinal and no supraclavicular adenopathy present.   Neurological: He is alert. He is disoriented. He exhibits abnormal muscle tone.   Right upper and lower extremity weakness.  Right-sided facial droop.  Expressive aphasia noted.   Skin: Skin is warm and dry. No rash noted.   Psychiatric: He has a normal mood and affect. His behavior is normal. Judgment and thought content normal. Cognition and memory are normal.   Nursing note and vitals reviewed.      DIAGNOSTIC DATA:  WBC   Date Value Ref Range Status   03/13/2019 11.99 (H) 3.40 - 10.80 10*3/mm3 Final     RBC   Date Value Ref Range Status   03/13/2019 4.68 4.14 - 5.80 10*6/mm3 Final     Hemoglobin   Date Value Ref Range Status   03/13/2019 14.8 13.0 - 17.7 g/dL Final     Hematocrit   Date Value Ref Range Status   03/13/2019 44.0 37.5 - 51.0 % Final     MCV   Date Value Ref Range Status   03/13/2019 94.0 79.0 - 97.0 fL Final     MCH   Date Value Ref  Range Status   03/13/2019 31.6 26.6 - 33.0 pg Final     MCHC   Date Value Ref Range Status   03/13/2019 33.6 31.5 - 35.7 g/dL Final     RDW   Date Value Ref Range Status   03/13/2019 13.3 12.3 - 15.4 % Final     RDW-SD   Date Value Ref Range Status   03/13/2019 46.1 37.0 - 54.0 fl Final     MPV   Date Value Ref Range Status   03/13/2019 9.7 6.0 - 12.0 fL Final     Platelets   Date Value Ref Range Status   03/13/2019 181 140 - 450 10*3/mm3 Final     Neutrophil %   Date Value Ref Range Status   03/13/2019 92.7 (H) 42.7 - 76.0 % Final     Lymphocyte %   Date Value Ref Range Status   03/13/2019 3.3 (L) 19.6 - 45.3 % Final     Monocyte %   Date Value Ref Range Status   03/13/2019 3.3 (L) 5.0 - 12.0 % Final     Eosinophil %   Date Value Ref Range Status   03/13/2019 0.0 (L) 0.3 - 6.2 % Final     Basophil %   Date Value Ref Range Status   03/13/2019 0.1 0.0 - 1.5 % Final     Immature Grans %   Date Value Ref Range Status   03/13/2019 0.6 (H) 0.0 - 0.5 % Final     Neutrophils, Absolute   Date Value Ref Range Status   03/13/2019 11.13 (H) 1.40 - 7.00 10*3/mm3 Final     Lymphocytes, Absolute   Date Value Ref Range Status   03/13/2019 0.39 (L) 0.70 - 3.10 10*3/mm3 Final     Monocytes, Absolute   Date Value Ref Range Status   03/13/2019 0.39 0.10 - 0.90 10*3/mm3 Final     Eosinophils, Absolute   Date Value Ref Range Status   03/13/2019 0.00 0.00 - 0.40 10*3/mm3 Final     Basophils, Absolute   Date Value Ref Range Status   03/13/2019 0.01 0.00 - 0.20 10*3/mm3 Final     Immature Grans, Absolute   Date Value Ref Range Status   03/13/2019 0.07 (H) 0.00 - 0.05 10*3/mm3 Final     nRBC   Date Value Ref Range Status   03/13/2019 0.0 0.0 - 0.0 /100 WBC Final       Glucose   Date Value Ref Range Status   03/14/2019 89 65 - 99 mg/dL Final     Sodium   Date Value Ref Range Status   03/14/2019 152 (H) 136 - 145 mmol/L Final     Potassium   Date Value Ref Range Status   03/14/2019 3.9 3.5 - 5.2 mmol/L Final     CO2   Date Value Ref Range  Status   03/14/2019 26.1 22.0 - 29.0 mmol/L Final     Chloride   Date Value Ref Range Status   03/14/2019 117 (H) 98 - 107 mmol/L Final     Anion Gap   Date Value Ref Range Status   03/14/2019 8.9 mmol/L Final     Creatinine   Date Value Ref Range Status   03/14/2019 0.85 0.76 - 1.27 mg/dL Final     BUN   Date Value Ref Range Status   03/14/2019 21 8 - 23 mg/dL Final     BUN/Creatinine Ratio   Date Value Ref Range Status   03/14/2019 24.7 7.0 - 25.0 Final     Calcium   Date Value Ref Range Status   03/14/2019 9.6 8.6 - 10.5 mg/dL Final     eGFR Non  Amer   Date Value Ref Range Status   03/14/2019 92 >60 mL/min/1.73 Final     Alkaline Phosphatase   Date Value Ref Range Status   03/14/2019 69 39 - 117 U/L Final     Total Protein   Date Value Ref Range Status   03/14/2019 5.8 (L) 6.0 - 8.5 g/dL Final     ALT (SGPT)   Date Value Ref Range Status   03/14/2019 19 1 - 41 U/L Final     AST (SGOT)   Date Value Ref Range Status   03/14/2019 19 1 - 40 U/L Final     Total Bilirubin   Date Value Ref Range Status   03/14/2019 0.8 0.1 - 1.2 mg/dL Final     Albumin   Date Value Ref Range Status   03/14/2019 3.80 3.50 - 5.20 g/dL Final     Globulin   Date Value Ref Range Status   03/14/2019 2.0 gm/dL Final         IMAGING: MRI brain from 3/12/2019 and CT imaging of the abdomen and pelvis from 3/13/2017 images personally reviewed.  Large brain lesion.  Evidence for widespread metastatic disease with lymphadenopathy and lung lesions.    IMPRESSION:  The patient has 2 right upper lobe irregular masses  concerning for bronchogenic malignancy. Both lesions may represent  synchronous tumors or a primary and secondary metastatic deposit. There  is an additional small noncalcified nodule within the right lower lobe  concerning for a metastatic lesion. The central right upper lobe lesion  is accessible for bronchoscopic guided biopsy. There is additionally  metastatic mediastinal and right hilar lymphadenopathy. Heterogeneous  right  adrenal lesion concerning for metastatic disease. A heterogeneous  lesion within the left distal pancreatic body is also concerning for an  additional site of metastatic disease.  2. There is increased density of fat seen adjacent to the right colon.  The etiology of this is uncertain and may be related to congestion  although peritoneal carcinomatosis cannot be entirely excluded.  3. Additional findings as above.    IMPRESSION:  There is a 6.4 x 5.4 x 5.9 cm contrast enhancing mass centered within  the left frontal lobe with considerable surrounding vasogenic edema  within the white matter of the left frontal and parietal lobes extending  to the left external capsule, that is likely representative of a  metastatic focus. There is marked mass effect upon the left lateral  ventricle and there is midline shift to the right by approximately 1 cm.  The right lateral ventricle demonstrates mild enlargement compatible  with a mild degree of entrapment. No significant interval change is seen  since yesterday's exam.     Within the left parietal scalp, there is a nonspecific 6 mm rounded  lesion that is likely representative of a sebaceous cyst, although  correlation with physical exam findings is suggested.    Assessment/Plan   ASSESSMENT:  This is a 63 y.o. male with:    1. History of rectosigmoid cancer status post neoadjuvant chemotherapy and radiation followed by 12 cycles of adjuvant FOLFOX therapy with all therapy complete 12/16/2013.  2. Large left frontal brain lesion with edema and associated complications from this with behavioral changes and altered mental status.  · Strong suspicion for metastatic rectal cancer.  · Metastatic lung cancer is possible as well.  · He is currently on Keppra and steroids  3. Evidence for metastatic disease otherwise  · He has lung lesions, pancreas lesion, lymphadenopathy, and possibly carcinomatosis.  · His performance status is poor and he has not a candidate for any therapy at  this time.  · We have no tissue diagnosis and both metastatic lung cancer and metastatic rectal cancer are possible among other possibilities.    RECOMMENDATIONS/PLAN:   I had a long discussion with him, his sisters, and his niece today and I spoke with Dr. Field as well.  We do not have a tissue diagnosis but metastatic colorectal cancer is suspected.  Lung cancer is certainly possible as well.  He has a reasonably small volume of metastatic disease outside of the brain which could be treated with systemic therapy if his performance status improves.  The brain lesion is the limiting factor at this point.  Technically, it is resectable per Dr. Field.  However, he will need postoperative radiation and inpatient rehabilitation following this and will never likely regain complete independence.  Additionally, if he does have his brain lesion resected, it is unclear how quickly his systemic disease otherwise might progress during his recovery time.  Nonetheless, again, the brain lesion is the limiting factor at this point and we will not be able to administer palliative systemic therapy unless this can be addressed.  Brain radiation will not likely address this as effectively as resection.    We would certainly need a tissue diagnosis before considering any therapy.  If no tissue was obtained from the brain, lung tissue could potentially be obtained via bronchoscopy.    Alternatively, palliative care and hospice would be reasonable at this point under the circumstances if he does not wish to try to prolong his life, likely with physical and perhaps other limitations.  Palliative care is meeting with him currently.    Thank you very much for allowing me to see this unfortunate gentleman.  I will continue to follow along and helped provide some guidance.    Jack Bae MD

## 2019-03-14 NOTE — PROGRESS NOTES
Adult Nutrition  Assessment/PES    Patient Name:  Jamel Rojas  YOB: 1955  MRN: [id number]  Admit Date:  3/11/2019    Assessment Date:  3/14/2019    Comments:  Nutrition follow up. Eating very well. Ate 2 dinner trays last night. 100% this am. Spoke with family. Likes sweets and johana milk. Will see if he will drink a johana boost plus and send with meals if he likes it.     Reason for Assessment     Row Name 03/14/19 0993          Reason for Assessment    Reason For Assessment  follow-up protocol     Diagnosis  cancer diagnosis/related complications metastatic disease         Nutrition/Diet History     Row Name 03/14/19 0962          Nutrition/Diet History    Typical Food/Fluid Intake  good appetite     Factors Affecting Nutritional Intake  other (see comments);impaired cognitive status/motor control aphasia         Anthropometrics     Row Name 03/14/19 0600          Anthropometrics    Weight  49.5 kg (109 lb 2 oz)         Labs/Tests/Procedures/Meds     Row Name 03/14/19 0983          Labs/Procedures/Meds    Lab Results Reviewed  reviewed, pertinent     Lab Results Comments  Na        Diagnostic Tests/Procedures    Diagnostic Test/Procedure Reviewed  reviewed, pertinent     Diagnostic Test/Procedures Comments  CT Chest, Abd, Pelvis        Medications    Pertinent Medications Reviewed  reviewed, pertinent     Pertinent Medications Comments  steroids, pepcid, laxatives, IVF         Physical Findings     Row Name 03/14/19 0957          Physical Findings    Overall Physical Appearance  loss of subcutaneous fat;loss of muscle mass;generalized wasting;underweight     Skin  other (see comments) mottled, bruising, redness blanchable           Nutrition Prescription Ordered     Row Name 03/14/19 0996          Nutrition Prescription PO    Current PO Diet  Regular         Evaluation of Received Nutrient/Fluid Intake     Row Name 03/14/19 0984          PO Evaluation    Number of Meals  2     % PO Intake   100%         Problem/Interventions:    Intervention Goal     Row Name 03/14/19 0949          Intervention Goal    General  Maintain nutrition;Reduce/improve symptoms;Meet nutritional needs for age/condition;Disease management/therapy     PO  Tolerate PO;Maintain intake;PO intake (%)     PO Intake %  100 %     Weight  No significant weight loss         Nutrition Intervention     Row Name 03/14/19 0950          Nutrition Intervention    RD/Tech Action  Follow Tx progress;Encourage intake;Care plan reviewd;Interview for preference;Recommend/ordered     Recommended/Ordered  Supplement         Nutrition Prescription     Row Name 03/14/19 0952          Nutrition Prescription PO    PO Prescription  Begin/change supplement     Supplement  Boost Plus     Supplement Frequency  2 times a day     New PO Prescription Ordered?  Yes         Education/Evaluation     Row Name 03/14/19 0952          Education    Education  Will Instruct as appropriate        Monitor/Evaluation    Monitor  Per protocol;Weight;I&O;Skin status;PO intake;Supplement intake;Symptoms;Pertinent labs           Electronically signed by:  Alexa Maldonado RD  03/14/19 9:53 AM

## 2019-03-14 NOTE — CONSULTS
Purpose of the visit was to evaluate for: goals of care/advanced care planning, support for patient/family, pain/symptom management and comfort care. Spoke with RN as well as patient and family and discussed palliative care, goals of care, care options and [facility].      Assessment:  Patient is palliative care appropriate for inpatient care given metastatic cancer to brain, lung and other organs. He is somewhat aphasic right now, has missed work for about a month or so, lost a lot of weight. PPS 40%.     Recommendations/Plan: Patient and family making decisions about next steps and treatment options.     Other Comments: Met with patient and family at bedside. Dr. Bae and Dr. Field have consulted. Family is making decisions with the patient. Gave information about Palliative Care if decisions are made to not have aggressive treatments or surgery. They are thinking about surgery at this point to give him more time at home. They also asked questions about comfort care along with treatments. Left information and we will continue to follow.     Total time: 30 minutes.

## 2019-03-14 NOTE — PLAN OF CARE
Problem: Patient Care Overview  Goal: Plan of Care Review  Outcome: Ongoing (interventions implemented as appropriate)   03/14/19 5250   Coping/Psychosocial   Plan of Care Reviewed With patient   OTHER   Outcome Summary Pt continues to have R side weakness and decreased coordination, therefore requiring increased assistance with gait and balance. PT will continue to follow to address strength, mobility ,and gait.

## 2019-03-14 NOTE — PROGRESS NOTES
Dr. TOMASZ Simeon    [facility] INTENSIVE CARE    3/14/2019    Patient ID:  Name:  Jamel Rojas  MRN:  [id number]  1955  63 y.o.  male            CC/Reason for visit: Large brain mass and many other medical problems listed below    HPI: Patient unable to speak.  Patient still having dysmetria and confusion as well as inability to articulate his thoughts, garbled speech at times.  Additional workup shows possible metastatic disease.    ROS: Negative for seizures.  Positive for confusion    Vitals:  Vitals:    03/14/19 1203 03/14/19 1302 03/14/19 1403 03/14/19 1503   BP: 131/65 103/66 101/69 105/72   Pulse: 82 69 61 66   Resp: 12 14 12 13   Temp:       TempSrc:       SpO2: 98% 98% 95% 96%   Weight:               Body mass index is 16.59 kg/m².    Intake/Output Summary (Last 24 hours) at 3/14/2019 1532  Last data filed at 3/14/2019 1240  Gross per 24 hour   Intake 1296 ml   Output 175 ml   Net 1121 ml       Exam:  GEN:  No distress  Alert, unable to speak.  Occasionally he will follow a few simple commands but mostly remains dysmetric, aphasic and confused.  LUNGS: Clear breath sounds bilat, nonlabored breathing  CV:  Normal S1S2, without murmur, no edema  ABD:  Non tender, no enlarged liver or masses  EXT:  No cyanosis or clubbing    Scheduled meds:    dexamethasone 4 mg Intravenous Q8H   influenza vaccine 0.5 mL Intramuscular Once   insulin lispro 0-7 Units Subcutaneous 4x Daily With Meals & Nightly   levETIRAcetam 1,000 mg Intravenous Q12H     IV meds:                           Data Review:   I reviewed the patient's medications and new clinical results.  Lab Results   Component Value Date    CALCIUM 9.6 03/14/2019     Results from last 7 days   Lab Units 03/14/19  0617 03/13/19  0602 03/12/19  0608 03/11/19  1446   SODIUM mmol/L 152* 151* 152* 148*   POTASSIUM mmol/L 3.9 3.9 4.0 4.1   CHLORIDE mmol/L 117* 114* 113* 103   CO2 mmol/L 26.1 24.8 24.3 21.5*   BUN mg/dL 21 19 29* 28*   CREATININE  mg/dL 0.85 0.85 0.89 1.18   CALCIUM mg/dL 9.6 9.7 9.6 10.6*   BILIRUBIN mg/dL 0.8 1.5* 1.6* 2.6*   ALK PHOS U/L 69 68 78 91   ALT (SGPT) U/L 19 16 16 20   AST (SGOT) U/L 19 17 19 23   GLUCOSE mg/dL 89 153* 92 89   WBC 10*3/mm3  --  11.99* 8.67 10.41   HEMOGLOBIN g/dL  --  14.8 16.2 18.0*   PLATELETS 10*3/mm3  --  181 216 244   INR   --  1.13*  --   --                    ASSESSMENT:     History of rectal cancer    Altered mental status    Brain mass  Acute encephalopathy due to brain tumor  Evidence for metastatic disease otherwise,  lung lesions, pancreas lesion, lymphadenopathy, and possibly carcinomatosis.        PLAN:  Discussed the case with Dr. Bae and Dr. Field.  It seems as if the patient's family is opting for palliative neurosurgical resection of the brain mass to perhaps zhen the patient a few more weeks or months of life.  He has widely metastatic cancer, less than 6 months survival prognosis.  I would recommend comfort measures only with palliative care.  Family seems to want to proceed with brain surgery.  Continue antiepileptics.  Continue dexamethasone in the attempts to reduce brain swelling.  Watch closely for delirium or agitation due to size of brain tumor as well as high-dose dexamethasone.  Mechanical DVT prophylaxis for now.  High risk of bleeding in brain tumor if given anticoagulants.  We have to discuss placement of feeding tube such as PEG tube since the patient's dysphagia may be severe after brain surgery.    Total critical care time 35 minutes excluding any separately billable procedure time        Alek Simeon MD  3/14/2019

## 2019-03-15 NOTE — CONSULTS
[facility]  [phone]    CONSULT NOTE    INTERNAL MEDICINE   [facility]     Referring Provider: Dr. Simeon  Reason for Consultation: Assuming care for palliative care and comfort measures    Chief complaint: Patient is encephalopathic and unable to provide chief complaint    Subjective .     History of present illness: This is a 63-year-old gentleman with a history of rectosigmoid moderately differentiated adenocarcinoma.  He underwent a low anterior resection and colorectal anastomosis in April 2013.  Pathology showed positive lymph nodes at that time and he underwent chemotherapy and radiation.  He last followed up with his oncologist in 2015.  He presented on this admission with altered mental status and was found to have a large enhancing lesion within the left frontal lobe with significant vasogenic edema there was also significant mass-effect at that time.  Further evaluation for metastatic disease showed possible carcinomatosis as well as lesions within the lung pancreas and significant lymphadenopathy.  It was felt that this likely represent metastatic recurrent rectal cancer.  However lung cancer cannot be excluded.  His overall prognosis was felt to be poor and he did not want to undergo any further chemotherapy.  They had discussed with neurosurgery regarding the possibility of undergoing palliative or debulking of the tumor in order to provide him some quality of life however at this point based on his continued clinical decline the family has wished to pursue palliative care comfort measures.  They are prepared to withdraw life-sustaining therapy and move in the direction of complete comfort measures.  They no longer wish to pursue any surgical interventions.  We have been consulted by the primary team to both assume care and assist with palliative care management.  The patient is unable to provide any history but does follow commands at the bedside.  I  did call and speak with his next of kin Jina Mitchell who confirmed the plan for palliative care.  We discussed medications used and there uses here in the hospital.  We discussed the overall plan and need for hospice consult here in the hospital.  We discussed hospice scatter bed status.    Review of Systems  Unable to assess review of systems systems secondary to patient's current mental status    Past Medical History:   Diagnosis Date   • Hepatitis C     TREATED WITH HARVONY   • History of hepatitis B    • Kidney stones    • Rectal cancer (CMS/HCC)      Past Surgical History:   Procedure Laterality Date   • APPENDECTOMY  2013    Low Anterior Resection with Colorectal anastomisis, diverting loop ileostomy and appendectomy-Dr. Ben Arreola   • COLONOSCOPY N/A 2018    Procedure: COLONOSCOPY TO CECUM;  Surgeon: Ben Arreola MD;  Location: [facility] ENDOSCOPY;  Service: Gastroenterology   • FLEXIBLE SIGMOIDOSCOPY  2013    Dr. Ben Arreola   • FLEXIBLE SIGMOIDOSCOPY  2013    Dr. Ben Arreola   • LOW ANTERIOR BOWEL RESECTION  2013    Low Anterior Resection with Colorectal anastomisis, diverting loop ileostomy and appendectomy-Dr. Ben Arreola   • MEDIPORT INSERTION, SINGLE Left 2013    Dr. Ben Arreola   • MEDIPORT REMOVAL  2013   • RESECTION SMALL BOWEL / CLOSURE ILEOSTOMY Left 2013    Dr. Ben Arreola     Family History   Problem Relation Age of Onset   • Malig Hyperthermia Neg Hx      Social History     Tobacco Use   • Smoking status: Former Smoker     Last attempt to quit:      Years since quittin.2   • Smokeless tobacco: Never Used   Substance Use Topics   • Alcohol use: No   • Drug use: No     No medications prior to admission.       dexamethasone 4 mg Intravenous Q8H   levETIRAcetam 1,000 mg Intravenous Q12H     Allergies:   Hydrocodone    Objective     Vital Signs   Temp:  [98 °F (36.7 °C)-98.6 °F (37 °C)] 98.1 °F (36.7 °C)  Heart  "Rate:  [62-88] 88  Resp:  [14-16] 16  BP: ()/(70-90) 117/90    Intake/Output Summary (Last 24 hours) at 3/15/2019 1528  Last data filed at 3/15/2019 1301  Gross per 24 hour   Intake 1080 ml   Output 200 ml   Net 880 ml     Flowsheet Rows      First Filed Value   Admission Height  172.7 cm (67.99\") Documented at 03/14/2019 1936   Admission Weight  49.1 kg (108 lb 4.8 oz) Documented at 03/11/2019 1453          Physical Exam:  General: Upon arrival he was asleep but easily arousable.  He is very restless in the bed very fidgety pulling at things.  He is unresponsive verbally but does follow commands.  HEENT: Normocephalic atraumatic extraocular movements are intact  Cardiac: Regular rate and rhythm  Pulmonary: Clear to auscultation bilaterally good air entry  Abdomen: Soft nontender nondistended positive bowel sounds  Extremities: No edema no cyanosis  Neuro: He is moving all 4 extremities but unable to speak currently.  It is unclear if he has been speaking some during the day but not speaking with me at all.  Psychiatric: Unable to assess secondary to nonverbal status    Results Review:   I reviewed the patient's new clinical results.  I reviewed the patient's new imaging results and agree with the interpretation.  I reviewed the patient's other test results and agree with the interpretation    Results from last 7 days   Lab Units 03/13/19  0602   WBC 10*3/mm3 11.99*   HEMOGLOBIN g/dL 14.8   HEMATOCRIT % 44.0   PLATELETS 10*3/mm3 181     Results from last 7 days   Lab Units 03/15/19  0516   SODIUM mmol/L 151*   POTASSIUM mmol/L 3.6   CHLORIDE mmol/L 111*   CO2 mmol/L 28.1   BUN mg/dL 20   CREATININE mg/dL 0.85   GLUCOSE mg/dL 168*   CALCIUM mg/dL 9.1     Results from last 7 days   Lab Units 03/15/19  0516   ALK PHOS U/L 76   BILIRUBIN mg/dL 0.8   ALT (SGPT) U/L 23   AST (SGOT) U/L 21     Results from last 7 days   Lab Units 03/13/19  0602   INR  1.13*     Results from last 7 days   Lab Units 03/11/19  1446   CK " TOTAL U/L 201*         Assessment/Plan       History of rectal cancer    Altered mental status    Brain mass    The plan at this point is to move to [facility] medications for comfort and begin comfort medications.  I have ordered Ativan morphine Robinul Zofran and Phenergan and Haldol for symptom management.  I discontinue nonessential medications.  At this point I recommend continuing the IV Keppra and the IV steroids.  These will both provide further comfort and prevent seizure and other complications.  If would begin to have significant complications from the steroids we can discontinue those at that time.  I placed a hospice consult and I do feel he is appropriate for hospice scatter bed.  He has had a lot of agitation and likely require medications for symptom management.  With no treatment and no further interventions he likely has hours to days remaining.    I discussed the patients findings and my recommendations with patient, family and nursing staff    Thank you very much for allowing us to participate in your patient's care.    Ney Urias MD  03/15/19  3:28 PM    Time: 60 mins

## 2019-03-15 NOTE — PROGRESS NOTES
[facility] INPATIENT PROGRESS NOTE    Length of Stay:  4 days    CHIEF COMPLAINT/REASON FOR VISIT:  1.  History of locally advanced rectosigmoid cancer.  Previously followed by Dr. Ayala.  He was last seen on 6/17/2015.  He was treated with neoadjuvant chemotherapy and radiation and had a low anterior resection with colorectal anastomosis and diverting loop ileostomy on 4/9/2013.  Pathology showed moderately differentiated adenocarcinoma, numerous matted lymph nodes positive for metastatic adenocarcinoma with the exact number difficult to quantify, 13 additional lymph nodes 1 of which was metastatic adenocarcinoma and one with fibro-xanthomatous reaction suggestive of treatment response.  YpT3, pN2.  He went on to complete 12 cycles of adjuvant FOLFOX chemotherapy with therapy complete as of 12/16/2013.  Tiny nodules on follow-up imaging in the lungs which resolved.  He was last seen on 6/17/2015 with annual follow-up anticipated although it is unclear that he followed up with us.  2.  He had a negative colonoscopy by Dr. Arreola on 5/23/2018.  3.  He developed altered mental status and was admitted to [facility] on 3/11/2019.  Chest x-ray showed a change at the right apex.  CT head on 3/11/2019 followed by MRI of the brain on 3/12/2019 showed a 6.4 x 5.4 x 5.9 cm enhancing mass within the left frontal lobe with considerable surrounding vasogenic edema.  Marked mass-effect upon the left lateral ventricle was noted.  4.  CT imaging of the chest abdomen pelvis on 3/13/2019 shows right upper lobe lesion with calcification measuring 4.3 cm.  This extends within the mediastinum and abuts the trachea and medial wall of the esophagus.  It extends up to the bob.  The lesion abuts the right upper lobe bronchus.  2.5 cm pleural-based lesion at the right upper lobe.  Right lower lobe nodule measures 6 mm.  Right hilar lymphadenopathy measures 2 cm.  2.4 cm precarinal lymph node.  Appears normal.   "Inferior right hepatic lobe tiny hypoattenuating lesion present.  2.1 cm distal pancreas hypoattenuating lesion concerning for metastatic lesion.  Right adrenal mass measuring 3.0 cm.  Normal left adrenal gland.  Possibility of peritoneal carcinomatosis.    SUBJECTIVE:  Less responsive today.  Not speaking.     ROS:  Positive for - unable to be obtained  Negative for - unable to be obtained    OBJECTIVE:  Vitals:    03/14/19 1936 03/14/19 2317 03/15/19 0710 03/15/19 1301   BP: 120/77 101/81 116/84 117/90   BP Location: Left arm Right arm Left arm Left arm   Patient Position: Lying Lying Lying Lying   Pulse: 72  66 88   Resp: 16 16 16 16   Temp: 98.3 °F (36.8 °C) 98 °F (36.7 °C) 98.5 °F (36.9 °C) 98.1 °F (36.7 °C)   TempSrc: Oral Oral Oral Oral   SpO2:  97% 96% 96%   Weight: 49 kg (108 lb)      Height: 172.7 cm (67.99\")            PHYSICAL EXAMINATION:  General:  Opens eyes to voice and squeezes my fingers with his left hand  Chest/Lungs:  CTAB  Heart:  RRR  Abdomen/GI:  Soft, NABS  Extremities:  WWP    DIAGNOSTIC DATA:  Results Review:     I reviewed the patient's new clinical results.    Results from last 7 days   Lab Units 03/13/19  0602   WBC 10*3/mm3 11.99*   HEMOGLOBIN g/dL 14.8   HEMATOCRIT % 44.0   PLATELETS 10*3/mm3 181     Lab Results   Component Value Date    NEUTROABS 11.13 (H) 03/13/2019     Results from last 7 days   Lab Units 03/15/19  0516   SODIUM mmol/L 151*   POTASSIUM mmol/L 3.6   CHLORIDE mmol/L 111*   CO2 mmol/L 28.1   BUN mg/dL 20   CREATININE mg/dL 0.85   GLUCOSE mg/dL 168*   CALCIUM mg/dL 9.1     Results from last 7 days   Lab Units 03/13/19  0602   INR  1.13*             IMAGING:    IMAGING: MRI brain from 3/12/2019 and CT imaging of the abdomen and pelvis from 3/13/2017.  Large brain lesion.  Evidence for widespread metastatic disease with lymphadenopathy and lung lesions.     IMPRESSION:  The patient has 2 right upper lobe irregular masses  concerning for bronchogenic malignancy. Both " lesions may represent  synchronous tumors or a primary and secondary metastatic deposit. There  is an additional small noncalcified nodule within the right lower lobe  concerning for a metastatic lesion. The central right upper lobe lesion  is accessible for bronchoscopic guided biopsy. There is additionally  metastatic mediastinal and right hilar lymphadenopathy. Heterogeneous  right adrenal lesion concerning for metastatic disease. A heterogeneous  lesion within the left distal pancreatic body is also concerning for an  additional site of metastatic disease.  2. There is increased density of fat seen adjacent to the right colon.  The etiology of this is uncertain and may be related to congestion  although peritoneal carcinomatosis cannot be entirely excluded.  3. Additional findings as above.     IMPRESSION:  There is a 6.4 x 5.4 x 5.9 cm contrast enhancing mass centered within  the left frontal lobe with considerable surrounding vasogenic edema  within the white matter of the left frontal and parietal lobes extending  to the left external capsule, that is likely representative of a  metastatic focus. There is marked mass effect upon the left lateral  ventricle and there is midline shift to the right by approximately 1 cm.  The right lateral ventricle demonstrates mild enlargement compatible  with a mild degree of entrapment. No significant interval change is seen  since yesterday's exam.     Within the left parietal scalp, there is a nonspecific 6 mm rounded  lesion that is likely representative of a sebaceous cyst, although  correlation with physical exam findings is suggested.          Assessment/Plan   ASSESSMENT/PLAN:  This is a 63 y.o. male with:     1. History of rectosigmoid cancer status post neoadjuvant chemotherapy and radiation followed by 12 cycles of adjuvant FOLFOX therapy with all therapy complete 12/16/2013.  2. Large left frontal brain lesion with edema and associated complications from this with  behavioral changes and altered mental status.  · Strong suspicion for metastatic rectal cancer.  · Metastatic lung cancer is possible as well.  · He is currently on Keppra and steroids  3. Evidence for metastatic disease otherwise  · He has lung lesions, pancreas lesion, lymphadenopathy, and possibly carcinomatosis.  · His performance status is poor and he has not a candidate for any therapy at this time.  · We have no tissue diagnosis and both metastatic lung cancer and metastatic rectal cancer are possible among other possibilities.    RECOMMENDATIONS:  Considering his neurologic decline, palliative care is appropriate.        Will sign off.  Call us back with questions.            Jack Bae MD

## 2019-03-15 NOTE — DISCHARGE SUMMARY
Patient Identification:  Name: Jamel Rojas  Age: 63 y.o.  Sex: male  :  1955  MRN: [id number]  Primary Care Physician: Mary Jo Wells MD    Admit date: 3/11/2019  Discharge date and time: March 15, 2019  Discharged Condition: poor    Discharge Diagnoses:    History of rectal cancer    Altered mental status    Brain mass  Brain compression  Aphasia  Probable metastatic lesions to pancreas, lungs and lymphadenopathy    Hospital Course: Jamel Rojas presented to [facility] because of confusion, inability to speak.  He was found to have a very large left frontal brain lesion, most likely to be metastatic from rectal cancer.  He was found to have many other metastatic lesions.  The patient is appropriate for hospice care since he only has days to weeks to live.  This is a very unfortunate situation.  The patient has been seen by oncology and neurosurgery.  Multiple conversations have been had with the family and power of .  He is being discharged today to hospice care, and Dr. Keita will be assuming care.  He will receive comfort measures and palliative care.      Consults:   IP CONSULT TO NEUROSURGERY  IP CONSULT TO PULMONOLOGY  IP CONSULT TO CASE MANAGEMENT   IP CONSULT TO NUTRITION SERVICES  IP CONSULT TO ADVANCE CARE PLANNING  IP CONSULT TO HEMATOLOGY AND ONCOLOGY  IP CONSULT TO PALLIATIVE CARE NURSE  IP CONSULT TO HEMATOLOGY AND ONCOLOGY  IP CONSULT TO PALLIATIVE CARE NURSE  IP CONSULT TO INTERNAL MEDICINE    Significant Diagnostic Studies:   Imaging Results (most recent)     Procedure Component Value Units Date/Time    CT Chest With Contrast [[id number]] Collected:  19 1032     Updated:  19 1103    Narrative:       CT CHEST, ABDOMEN AND PELVIS WITH CONTRAST     HISTORY: 63-year-old male with brain lesion.  Prior history of colon  cancer.     TECHNIQUE: Axial CT images of the chest abdomen and pelvis were obtained  following  administration of intravenous and oral contrast. Coronal and  sagittal reconstructions were then obtained.     COMPARISON: CT chest from 06/10/2015, CT abdomen and pelvis from  04/20/2016.     FINDINGS:  CT CHEST: The visualized thyroid gland is normal. There is an irregular  large posterior right upper lobe paramidline lesion containing  hyperdense foci of calcification. The lesion measures approximately 4.3  cm craniocaudal x 3.7 cm transverse and 3.7 cm in AP dimension. The  lesion extends within the mediastinum and is immediately adjacent to the  posterior wall of the trachea and medial wall of the esophagus. Lesion  extends up to the level of the bob. The lesion also abuts the right  upper lobe bronchus and its subdivisions. There is an additional  pleural-based lesion seen within the anteromedial right upper lobe  measuring 2.5 x 1.7 cm. Noncalcified right lower lobe nodule seen on  image 51 measures 6 mm. There is an enlarged right hilar lymph node  measuring 2.0 x 1.5 cm. There is an enlarged precarinal lymph node that  measures up to 2.4 cm. No pleural or pericardial effusion is seen. There  is dilatation of the ascending aorta that measures up to 3.7 cm. The  descending thoracic aorta demonstrates a mid thoracic AP diameter of 2.5  cm.     CT ABDOMEN AND PELVIS: The liver demonstrates normal attenuation. There  is a tiny hypoattenuating subcapsular lesion seen within the inferior  right hepatic lobe, image 26 that is too small to accurately  characterize however this cannot be identified with confidence on prior  imaging from 2016. The spleen is unremarkable. Heterogeneously  hypoattenuating lesion is seen within the distal pancreatic body  measuring approximately 2.1 x 1.4 cm. This is most concerning for a  metastatic lesion. The gallbladder is normal. There is a new  heterogenous right adrenal mass measuring 3.0 x 2.3 cm. The left adrenal  gland is normal. Both kidneys are normal in size and  attenuation. Tiny  punctate nephroliths are demonstrated bilaterally. The urinary bladder  is partially distended and normal. A primary anastomosis seen in the  region of the rectum that appears unremarkable. There is mild presacral  soft tissue thickening that is unchanged from prior imaging. No evidence  of bowel obstruction. There is increased density within the fat seen in  the right paracolic gutter adjacent to the colon, axial image 62.       Impression:       1. The patient has 2 right upper lobe irregular masses concerning for  bronchogenic malignancy. Both lesions may represent synchronous tumors  or a primary and secondary metastatic deposit. There is an additional  small noncalcified nodule within the right lower lobe concerning for a  metastatic lesion. The central right upper lobe lesion is accessible for  bronchoscopic guided biopsy. There is additionally metastatic  mediastinal and right hilar lymphadenopathy. Heterogeneous right adrenal  lesion concerning for metastatic disease. A heterogeneous lesion within  the left distal pancreatic body is also concerning for an additional  site of metastatic disease.  2. There is increased density of fat seen adjacent to the right colon.  The etiology of this is uncertain and may be related to congestion  although peritoneal carcinomatosis cannot be entirely excluded.  3. Additional findings as above.        Radiation dose reduction techniques were utilized, including automated  exposure control and exposure modulation based on body size.     This report was finalized on 3/14/2019 11:00 AM by Dr. Hamzah Camarillo M.D.       CT Abdomen Pelvis With Contrast [[id number]] Collected:  03/13/19 1032     Updated:  03/14/19 1103    Narrative:       CT CHEST, ABDOMEN AND PELVIS WITH CONTRAST     HISTORY: 63-year-old male with brain lesion.  Prior history of colon  cancer.     TECHNIQUE: Axial CT images of the chest abdomen and pelvis were obtained  following  attenuation. Tiny  punctate nephroliths are demonstrated bilaterally. The urinary bladder  is partially distended and normal. A primary anastomosis seen in the  region of the rectum that appears unremarkable. There is mild presacral  soft tissue thickening that is unchanged from prior imaging. No evidence  of bowel obstruction. There is increased density within the fat seen in  the right paracolic gutter adjacent to the colon, axial image 62.       Impression:       1. The patient has 2 right upper lobe irregular masses concerning for  bronchogenic malignancy. Both lesions may represent synchronous tumors  or a primary and secondary metastatic deposit. There is an additional  small noncalcified nodule within the right lower lobe concerning for a  metastatic lesion. The central right upper lobe lesion is accessible for  bronchoscopic guided biopsy. There is additionally metastatic  mediastinal and right hilar lymphadenopathy. Heterogeneous right adrenal  lesion concerning for metastatic disease. A heterogeneous lesion within  the left distal pancreatic body is also concerning for an additional  site of metastatic disease.  2. There is increased density of fat seen adjacent to the right colon.  The etiology of this is uncertain and may be related to congestion  although peritoneal carcinomatosis cannot be entirely excluded.  3. Additional findings as above.        Radiation dose reduction techniques were utilized, including automated  exposure control and exposure modulation based on body size.     This report was finalized on 3/14/2019 11:00 AM by Dr. Hamzah Camarillo M.D.       MRI Brain With & Without Contrast [[id number]] Collected:  03/12/19 1612     Updated:  03/12/19 1732    Narrative:       MRI OF THE BRAIN WITH AND WITHOUT CONTRAST      CLINICAL HISTORY: Brain mass.     TECHNIQUE: MRI of the brain was obtained with sagittal pre and  postgadolinium T1, axial pre and postgadolinium T1, coronal  postgadolinium  T1, axial post gadolinium 3D SPGR, axial FLAIR, axial T2,  axial diffusion, and axial susceptibility weighted images.     COMPARISON: Comparison is made to prior CT scan of the head dated  03/11/2019.     FINDINGS: There is a large peripherally contrast enhancing mass centered  within the left frontal lobe which measures up to approximately 6.4 x  5.4 cm in greatest coronal dimensions and approximately 5.9 cm in  greatest AP dimensions. There is considerable surrounding vasogenic  edema within the white matter of the left frontal and parietal lobes and  this extends into the left external capsule. There is marked mass effect  upon the left lateral ventricle and there is midline shift to the right  by approximately 1 cm. The right lateral ventricle demonstrates mild  enlargement compatible with some degree of entrapment. No additional  contrast-enhancing lesions are identified.      When compared to the prior study of 03/11/2019, there is no significant  interval change.     The major intracranial flow related signal voids are within normal  limits. There are no abnormal areas of restricted diffusion.  Susceptibility artifact is seen within the mass likely representing  areas of mineralization.     Within the left parietal scalp, there is a nonspecific T2 hyperintense  lesion which measures up to 6 mm in diameter which is likely  representative of a sebaceous cyst, although correlation with physical  exam findings is suggested.       Impression:       There is a 6.4 x 5.4 x 5.9 cm contrast enhancing mass centered within  the left frontal lobe with considerable surrounding vasogenic edema  within the white matter of the left frontal and parietal lobes extending  to the left external capsule, that is likely representative of a  metastatic focus. There is marked mass effect upon the left lateral  ventricle and there is midline shift to the right by approximately 1 cm.  The right lateral ventricle demonstrates mild  enlargement compatible  with a mild degree of entrapment. No significant interval change is seen  since yesterday's exam.     Within the left parietal scalp, there is a nonspecific 6 mm rounded  lesion that is likely representative of a sebaceous cyst, although  correlation with physical exam findings is suggested.           This report was finalized on 3/12/2019 5:29 PM by Dr. Bill August M.D.       CT Head Without Contrast [[id number]] Collected:  03/11/19 1613     Updated:  03/11/19 1623    Narrative:       CT HEAD WO CONTRAST-     INDICATIONS: Altered mental status, rectal cancer     TECHNIQUE: Radiation dose reduction techniques were utilized, including  automated exposure control and exposure modulation based on body size.      Noncontrast head CT     COMPARISON: None available     FINDINGS:      A large mass suggesting metastatic disease in this clinical setting,  although nonspecific, located in the left frontal lobe is measured at  6.0 x 5.7 cm on image 33 of series 1, appears peripherally dense and  centrally predominantly hypodense/necrotic, with conspicuous surrounding  edema. Multiple small (typically up to about 3 mm) hyperdense foci are  present within the mass, and may be calcifications and/or foci of  hemorrhage. Conspicuous mass effect results in rightward subfalcine  herniation left frontal lobe (including a portion of the mass, with as  much as about 1.6 cm rightward shift of midline structures on image 32  of series 1), sulcal effacement, effacement of much of the left lateral  ventricle, partial effacement of the anterior horn of the right lateral  ventricle and anterior right third ventricle.     No definite additional masses are identified, but enhanced imaging is  suggested for more sensitive evaluation for additional lesions, as  indicated.     The visualized paranasal sinuses, orbits, mastoid air cells are  unremarkable.                   Impression:          Critical test result. A large  mass of the left frontal lobe, possibly  containing foci of hemorrhage, as described above.     Discussed by telephone with Dr. Edwards at 1610, 3/11/2019.     This report was finalized on 3/11/2019 4:20 PM by Dr. Kamaljit Feliciano M.D.       XR Chest 1 View [[id number]] Collected:  03/11/19 1539     Updated:  03/11/19 1545    Narrative:       XR CHEST 1 VW-     HISTORY: Male who is 63 years-old,  altered mental status     TECHNIQUE: Frontal views of the chest     COMPARISON: 1/7/2013     FINDINGS: Heart, mediastinum and pulmonary vasculature are unremarkable.  Appearance of fibronodular change at the right apex, appears more  conspicuous when correlated with the CT from 6/10/2015, CT can be  obtained for direct comparison with prior CT exam, to exclude  possibility of an underlying lesion. No focal pulmonary consolidation,  pleural effusion, or pneumothorax. Pulmonary hyperinflation suggests  COPD. No acute osseous process.       Impression:       Appearance of fibronodular change at the right apex,  appears more conspicuous when correlated with the CT from 6/10/2015, CT  can be obtained for direct comparison with prior CT exam.      This report was finalized on 3/11/2019 3:42 PM by Dr. Kamaljit Feliciano M.D.                   TEST  RESULTS PENDING AT DISCHARGE      Discharge Exam:  Alert, and awake, little restless, nonverbal, cannot speak.  Does not follow all commands.  Supple neck, midline trach  RRR, no m/r/g, no edema  Clear bilaterally, no wheezing, nonlabored  No clubbing or cyanosis     Disposition:  Hospice scattered bed         Medication Reconciliation: Please see electronically completed Med Rec.    Total time spent discharging < 30 minutes.    Signed:  Alek Simeon MD  3/15/2019  2:25 PM

## 2019-03-15 NOTE — PROGRESS NOTES
[facility] PROGRESS NOTE    PATIENT IDENTIFICATION:   Name:  Jamel Rojas      MRN:  [id number]     63 y.o.  male               CC: large L frontal brain mass      Subjective     Interval History: none.     Objective     Vital signs in last 24 hours:  Temp:  [98 °F (36.7 °C)-98.6 °F (37 °C)] 98.5 °F (36.9 °C)  Heart Rate:  [61-72] 66  Resp:  [12-16] 16  BP: ()/(66-84) 116/84  ICP ranges-    Intake/Output last 3 shifts:  I/O last 3 completed shifts:  In: 1536 [P.O.:1080; I.V.:456]  Out: 375 [Urine:375]    Intake/Output this shift:  I/O this shift:  In: 360 [P.O.:360]  Out: -       Physical Exam:  General:   Awake, alert, not talking, less communicative today. NAD  Thin, chronically ill appearing   Turns voice and follows to voice  Appears to be mouthing words, but no sound  STARR well        LABS:    Lab Results (last 24 hours)     Procedure Component Value Units Date/Time    POC Glucose Once [[id number]]  (Normal) Collected:  03/14/19 1555    Specimen:  Blood Updated:  03/14/19 1556     Glucose 88 mg/dL     POC Glucose Once [[id number]]  (Abnormal) Collected:  03/14/19 2058    Specimen:  Blood Updated:  03/14/19 2113     Glucose 136 mg/dL     Comprehensive Metabolic Panel [[id number]]  (Abnormal) Collected:  03/15/19 0516    Specimen:  Blood Updated:  03/15/19 0616     Glucose 168 mg/dL      BUN 20 mg/dL      Creatinine 0.85 mg/dL      Sodium 151 mmol/L      Potassium 3.6 mmol/L      Chloride 111 mmol/L      CO2 28.1 mmol/L      Calcium 9.1 mg/dL      Total Protein 5.8 g/dL      Albumin 3.80 g/dL      ALT (SGPT) 23 U/L      AST (SGOT) 21 U/L      Alkaline Phosphatase 76 U/L      Total Bilirubin 0.8 mg/dL      eGFR Non African Amer 92 mL/min/1.73      Globulin 2.0 gm/dL      A/G Ratio 1.9 g/dL      BUN/Creatinine Ratio 23.5     Anion Gap 11.9 mmol/L     Narrative:       GFR Normal >60  Chronic Kidney Disease <60  Kidney Failure <15    POC Glucose Once [[id number]]  (Abnormal) Collected:   03/15/19 0606    Specimen:  Blood Updated:  03/15/19 0620     Glucose 139 mg/dL     POC Glucose Once [[id number]]  (Normal) Collected:  03/15/19 1103    Specimen:  Blood Updated:  03/15/19 1105     Glucose 119 mg/dL           IMAGING STUDIES:    No new imaging      Meds reviewed/changed: Yes    Current Facility-Administered Medications   Medication Dose Route Frequency Provider Last Rate Last Dose   • acetaminophen (TYLENOL) tablet 650 mg  650 mg Oral Q4H PRN Fernando Chaidez MD        Or   • acetaminophen (TYLENOL) suppository 650 mg  650 mg Rectal Q4H PRN Fernando Chaidez MD       • dexamethasone sodium phosphate injection 4 mg  4 mg Intravenous Q8H Heidy Burgos APRN   4 mg at 03/15/19 1055   • dextrose (D50W) 25 g/ 50mL Intravenous Solution 25 g  25 g Intravenous Q15 Min PRN Fernando Chaidez MD       • dextrose (GLUTOSE) oral gel 15 g  15 g Oral Q15 Min PRN Fernando Chaidez MD       • glucagon (human recombinant) (GLUCAGEN DIAGNOSTIC) injection 1 mg  1 mg Subcutaneous PRN Fernando Chaidez MD       • Influenza Vac Subunit Quad (FLUCELVAX) injection 0.5 mL  0.5 mL Intramuscular Once Alek Simeon MD       • insulin lispro (humaLOG) injection 0-7 Units  0-7 Units Subcutaneous 4x Daily With Meals & Nightly Alek Simeon MD       • ipratropium-albuterol (DUO-NEB) nebulizer solution 3 mL  3 mL Nebulization Q2H PRN Fernando Chaidez MD       • levETIRAcetam in NaCl 0.75% (KEPPRA) IVPB 1,000 mg  1,000 mg Intravenous Q12H Alek Simeon MD   1,000 mg at 03/15/19 0822   • ondansetron (ZOFRAN) tablet 4 mg  4 mg Oral Q6H PRN Fernando Chaidez MD        Or   • ondansetron ODT (ZOFRAN-ODT) disintegrating tablet 4 mg  4 mg Oral Q6H PRN Fernando Chaidez MD        Or   • ondansetron (ZOFRAN) injection 4 mg  4 mg Intravenous Q6H PRN Fernando Chaidez MD       • potassium chloride (MICRO-K) CR capsule 40 mEq  40 mEq Oral PRN Fernando Chaidez MD   40 mEq at 03/15/19 0822    Or   • potassium chloride (KLOR-CON) packet 40 mEq   40 mEq Oral PRN Fernando Chaidez MD        Or   • potassium chloride 10 mEq in 100 mL IVPB  10 mEq Intravenous Q1H PRN Fernando Chaidez MD       • sennosides-docusate sodium (SENOKOT-S) 8.6-50 MG tablet 2 tablet  2 tablet Oral Nightly PRN Alek Simeon MD       • sodium chloride 0.9 % flush 3-10 mL  3-10 mL Intravenous PRN Fernando Chaidez MD   10 mL at 03/15/19 0823       Assessment/Plan     ASSESSMENT:      History of rectal cancer    Altered mental status    Brain mass      PLAN: He is much less communicative today and is not verbalizing.  Family is now thinking that they may not proceed with surgery given the brevity of the situation and with his neuro decline today. They will think about it overnight and will let us know tomorrow.       I discussed the patients findings and my recommendations with patient, family, nursing staff and Dr Field       LOS: 4 days       Heidy Burgos, NAYANA  3/15/2019  12:05 PM

## 2019-03-15 NOTE — PLAN OF CARE
Problem: Skin Injury Risk (Adult)  Goal: Skin Health and Integrity  Outcome: Ongoing (interventions implemented as appropriate)      Problem: Fall Risk (Adult)  Goal: Absence of Fall  Outcome: Ongoing (interventions implemented as appropriate)      Problem: Patient Care Overview  Goal: Plan of Care Review  Outcome: Ongoing (interventions implemented as appropriate)   03/15/19 0519   Coping/Psychosocial   Plan of Care Reviewed With patient   OTHER   Outcome Summary Placed on seizure precaution.Pt is alert to self.Pt is incontient of B&B.Pt denies any pain or discomfort.Will continue to monitor.   Plan of Care Review   Progress no change       Problem: Confusion, Acute (Adult)  Goal: Identify Related Risk Factors and Signs and Symptoms  Outcome: Ongoing (interventions implemented as appropriate)

## 2019-03-16 NOTE — NURSING NOTE
I met with the family and discussed hospice care in the hospital setting.  At this time, our MD determined that the patient does qualify for hospice but not for a GIP admission.  Family would like to keep patient in the hospital at this time.  We will follow up on Monday with the family to see if symptoms are needing managing for a GIP admission to hospice.    Thanks for the referral  Yanick Griffin   [phone]

## 2019-03-16 NOTE — PLAN OF CARE
Problem: Skin Injury Risk (Adult)  Goal: Identify Related Risk Factors and Signs and Symptoms  Outcome: Outcome(s) achieved Date Met: 03/16/19    Goal: Skin Health and Integrity  Outcome: Ongoing (interventions implemented as appropriate)      Problem: Fall Risk (Adult)  Goal: Identify Related Risk Factors and Signs and Symptoms  Outcome: Outcome(s) achieved Date Met: 03/16/19    Goal: Absence of Fall  Outcome: Ongoing (interventions implemented as appropriate)      Problem: Patient Care Overview  Goal: Plan of Care Review  Outcome: Ongoing (interventions implemented as appropriate)   03/16/19 0437   Coping/Psychosocial   Plan of Care Reviewed With patient   OTHER   Outcome Summary pt was transferred from . pt is sleeping well. no complaints of pain at this time. pt is on seizure precautions, pt able to turn self. will continue to monitor and keep comfortable    Plan of Care Review   Progress no change     Goal: Individualization and Mutuality  Outcome: Ongoing (interventions implemented as appropriate)   03/16/19 0437   Individualization   Patient Specific Goals (Include Timeframe) To keep comfortable    Patient Specific Interventions PRN pain meds, assist with turns        Problem: Confusion, Acute (Adult)  Goal: Identify Related Risk Factors and Signs and Symptoms  Outcome: Outcome(s) achieved Date Met: 03/16/19    Goal: Cognitive/Functional Impairments Minimized  Outcome: Ongoing (interventions implemented as appropriate)    Goal: Safety  Outcome: Ongoing (interventions implemented as appropriate)      Problem: Palliative Care (Adult)  Goal: Identify Related Risk Factors and Signs and Symptoms  Outcome: Outcome(s) achieved Date Met: 03/16/19    Goal: Maximized Comfort  Outcome: Ongoing (interventions implemented as appropriate)    Goal: Enhanced Quality of Life  Outcome: Ongoing (interventions implemented as appropriate)

## 2019-03-16 NOTE — PROGRESS NOTES
LOS: 5 days     Name: Jamel Rojas  Age/Sex: 63 y.o. male  :  1955        PCP: Mary Jo Wells MD    Subjective   Patient is essentially nonverbal but does shake his head yes and no and grown some.  Per nursing no issues overnight rested well was able to eat a little bit for breakfast.  General: No Fever or Chills, Cardiac: No Chest Pain or Palpitations, Resp: No Cough or SOA, GI: No Nausea, Vomiting, or Diarrhea and Other: No bleeding      dexamethasone 4 mg Intravenous Q8H   levETIRAcetam 1,000 mg Intravenous Q12H          Objective   Vital Signs  Temp:  [97.8 °F (36.6 °C)-98.2 °F (36.8 °C)] 98.2 °F (36.8 °C)  Heart Rate:  [] 99  Resp:  [16] 16  BP: (109-121)/(87-96) 121/91  Body mass index is 16.43 kg/m².  No intake or output data in the 24 hours ending 19 1408    Physical Exam   Constitutional: He appears well-developed and well-nourished.   HENT:   Head: Normocephalic and atraumatic.   Cardiovascular: Normal rate, regular rhythm and normal heart sounds.   Pulmonary/Chest: Effort normal and breath sounds normal.   Abdominal: Soft. Bowel sounds are normal.   Neurological: He is alert.   Skin: Skin is warm and dry.   Nursing note and vitals reviewed.        Results Review:       I reviewed the patient's new clinical results.  Results from last 7 days   Lab Units 19  0602 19  0608 19  1446   WBC 10*3/mm3 11.99* 8.67 10.41   HEMOGLOBIN g/dL 14.8 16.2 18.0*   PLATELETS 10*3/mm3 181 216 244     Results from last 7 days   Lab Units 03/15/19  0516 19  0617 19  0602 19  0608 19  1446   SODIUM mmol/L 151* 152* 151* 152* 148*   POTASSIUM mmol/L 3.6 3.9 3.9 4.0 4.1   CHLORIDE mmol/L 111* 117* 114* 113* 103   CO2 mmol/L 28.1 26.1 24.8 24.3 21.5*   BUN mg/dL 20 21 19 29* 28*   CREATININE mg/dL 0.85 0.85 0.85 0.89 1.18   CALCIUM mg/dL 9.1 9.6 9.7 9.6 10.6*   Estimated Creatinine Clearance: 63.3 mL/min (by C-G formula based on SCr of 0.85  mg/dL).      Assessment/Plan     History of rectal cancer    Altered mental status    Brain mass      PLAN  -He looks a lot better today.  He was actually eating a little bit this morning and was much more interactive at the bedside.  -He is only required 1 dose of IV Zofran for nausea but is otherwise required no medications for comfort overnight.  -Yesterday he looked much worse and suspected that he might deteriorate much quicker however after stabilization overnight he may not be a candidate for a scatter bed.  I like to watch him through the weekend and see how his case progresses if he does not deteriorate further will need to make alternate plans.  -Continue steroids and IV Keppra for now    Disposition        Ney Urias MD  [facility]  03/16/19  2:08 PM

## 2019-03-16 NOTE — PLAN OF CARE
Problem: Patient Care Overview  Goal: Plan of Care Review  Outcome: Outcome(s) achieved Date Met: 03/16/19 03/16/19 7192   Coping/Psychosocial   Plan of Care Reviewed With patient;family   OTHER   Outcome Summary Small amount of emesis this am after eating breakfast. Zofran given. Janice met with sister, will reeval Monday. Pt becoming more restless this evening. Dilaudid, zofran, and Ativan given per MAR. Ivana at bedside. Will continue to monitor.   Plan of Care Review   Progress declining     Goal: Individualization and Mutuality  Outcome: Ongoing (interventions implemented as appropriate)    Goal: Discharge Needs Assessment  Outcome: Ongoing (interventions implemented as appropriate)    Goal: Interprofessional Rounds/Family Conf  Outcome: Ongoing (interventions implemented as appropriate)      Problem: Nutrition, Imbalanced: Inadequate Oral Intake (Adult)  Goal: Improved Oral Intake  Outcome: Ongoing (interventions implemented as appropriate)    Goal: Prevent Further Weight Loss  Outcome: Ongoing (interventions implemented as appropriate)      Problem: Confusion, Acute (Adult)  Goal: Cognitive/Functional Impairments Minimized  Outcome: Ongoing (interventions implemented as appropriate)    Goal: Safety  Outcome: Ongoing (interventions implemented as appropriate)      Problem: Palliative Care (Adult)  Goal: Maximized Comfort  Outcome: Ongoing (interventions implemented as appropriate)    Goal: Enhanced Quality of Life  Outcome: Ongoing (interventions implemented as appropriate)

## 2019-03-17 NOTE — PLAN OF CARE
Problem: Skin Injury Risk (Adult)  Goal: Skin Health and Integrity  Outcome: Ongoing (interventions implemented as appropriate)      Problem: Fall Risk (Adult)  Goal: Absence of Fall  Outcome: Ongoing (interventions implemented as appropriate)      Problem: Patient Care Overview  Goal: Plan of Care Review  Outcome: Ongoing (interventions implemented as appropriate)   03/17/19 0411   Coping/Psychosocial   Plan of Care Reviewed With patient   OTHER   Outcome Summary pt slept well most of the night with the afternoon pain meds pt recieved. Gave x1 dilaudid and x1 of ativan closer to end of shift. IV keppra given. Niece at bedside all night with pt. will continue to monitor and keep comfortable    Plan of Care Review   Progress no change     Goal: Individualization and Mutuality  Outcome: Ongoing (interventions implemented as appropriate)      Problem: Confusion, Acute (Adult)  Goal: Cognitive/Functional Impairments Minimized  Outcome: Ongoing (interventions implemented as appropriate)    Goal: Safety  Outcome: Ongoing (interventions implemented as appropriate)      Problem: Palliative Care (Adult)  Goal: Maximized Comfort  Outcome: Ongoing (interventions implemented as appropriate)    Goal: Enhanced Quality of Life  Outcome: Ongoing (interventions implemented as appropriate)

## 2019-03-17 NOTE — CONSULTS
Patient transferred to the palliative care unit following goals of care and treatment preferences discussion with Dr. Urias.  Patient and/or family state goal of care to be comfort and treatment preferences to be geared toward symptom management.  The palliative care team will continue to follow for any further needs.

## 2019-03-17 NOTE — PLAN OF CARE
Problem: Fall Risk (Adult)  Goal: Absence of Fall  Outcome: Ongoing (interventions implemented as appropriate)      Problem: Patient Care Overview  Goal: Plan of Care Review  Outcome: Ongoing (interventions implemented as appropriate)   03/17/19 1932   Coping/Psychosocial   Plan of Care Reviewed With family   OTHER   Outcome Summary PT slept on and off today, family alternated staying with pt, gave dilaudid 0.5 IV with turns, cont with IV Keppra,voiding very well, will contl to monitor.   Plan of Care Review   Progress declining

## 2019-03-17 NOTE — PROGRESS NOTES
LOS: 6 days     Name: Jamel Rojas  Age/Sex: 63 y.o. male  :  1955        PCP: Mary Jo Wells MD    Subjective   He is unresponsive, eyes are open but doesn't meet eye contact or purposely follow across the room      dexamethasone 4 mg Intravenous Q8H   levETIRAcetam 1,000 mg Intravenous Q12H          Objective   Vital Signs  Temp:  [98.6 °F (37 °C)-100.3 °F (37.9 °C)] 100.3 °F (37.9 °C)  Heart Rate:  [] 115  Resp:  [14-16] 14  BP: ()/(66-69) 101/69  Body mass index is 16.43 kg/m².  No intake or output data in the 24 hours ending 19 1206    Physical Exam   Constitutional:   Frail ill appearing   HENT:   Head: Normocephalic and atraumatic.   Cardiovascular: Normal rate, regular rhythm and normal heart sounds.   Pulmonary/Chest: Tachypnea noted. He is in respiratory distress.   Abdominal: Soft. Bowel sounds are normal.   Neurological:   Awake but lethargic and unresponsive   Skin: Skin is warm and dry.   Nursing note and vitals reviewed.        Results Review:       I reviewed the patient's new clinical results.  Results from last 7 days   Lab Units 19  0602 19  0608 19  1446   WBC 10*3/mm3 11.99* 8.67 10.41   HEMOGLOBIN g/dL 14.8 16.2 18.0*   PLATELETS 10*3/mm3 181 216 244     Results from last 7 days   Lab Units 03/15/19  0516 19  0617 19  0602 19  0608 19  1446   SODIUM mmol/L 151* 152* 151* 152* 148*   POTASSIUM mmol/L 3.6 3.9 3.9 4.0 4.1   CHLORIDE mmol/L 111* 117* 114* 113* 103   CO2 mmol/L 28.1 26.1 24.8 24.3 21.5*   BUN mg/dL 20 21 19 29* 28*   CREATININE mg/dL 0.85 0.85 0.85 0.89 1.18   CALCIUM mg/dL 9.1 9.6 9.7 9.6 10.6*   Estimated Creatinine Clearance: 63.3 mL/min (by C-G formula based on SCr of 0.85 mg/dL).      Assessment/Plan     History of rectal cancer    Altered mental status    Brain mass      PLAN  -He looks significantly worse today  -Required morphine ativan and zofran overnight  -Today he is non responsive  with shallow rapid breathing, hasn't eaten anything today  -Hospice is following and plan to transition to HSB once he is appropriate    Disposition  HSB tomorrow?      Ney Urias MD  [facility]  03/17/19  12:06 PM

## 2019-03-18 PROBLEM — C77.2 METASTATIC CANCER TO INTRA-ABDOMINAL LYMPH NODES (HCC): Status: ACTIVE | Noted: 2019-01-01

## 2019-03-18 PROBLEM — C19: Status: ACTIVE | Noted: 2019-01-01

## 2019-03-18 PROBLEM — C78.00 CANCER, METASTATIC TO LUNG (HCC): Status: ACTIVE | Noted: 2019-01-01

## 2019-03-18 PROBLEM — C79.31 METASTATIC CANCER TO BRAIN (HCC): Status: ACTIVE | Noted: 2019-01-01

## 2019-03-18 PROBLEM — D49.6 NEOPLASM OF BRAIN CAUSING MASS EFFECT ON ADJACENT STRUCTURES (HCC): Status: ACTIVE | Noted: 2019-01-01

## 2019-03-18 PROBLEM — Z51.5 PALLIATIVE CARE BY SPECIALIST: Status: ACTIVE | Noted: 2019-01-01

## 2019-03-18 PROBLEM — G93.6 VASOGENIC EDEMA (HCC): Status: ACTIVE | Noted: 2019-01-01

## 2019-03-18 PROBLEM — G93.5 NEOPLASM OF BRAIN CAUSING MASS EFFECT ON ADJACENT STRUCTURES (HCC): Status: ACTIVE | Noted: 2019-01-01

## 2019-03-18 NOTE — PROGRESS NOTES
Case Management Discharge Note    Final Note: Admitted to a [facility] scattered bed on 3/18/19. FARTUN Jimenez RN, CCP.     Destination - Selection Complete      Service Provider Request Status Selected Services Address Phone Number Fax Number    [facility] [address] [facility] [id number] [phone]      Durable Medical Equipment      No service has been selected for the patient.      Dialysis/Infusion      No service has been selected for the patient.      Home Medical Care      No service has been selected for the patient.      Community Resources      No service has been selected for the patient.             Final Discharge Disposition Code: 51 - hospice medical facility

## 2019-03-18 NOTE — PLAN OF CARE
Problem: Patient Care Overview  Goal: Plan of Care Review  Outcome: Ongoing (interventions implemented as appropriate)   03/18/19 7958   Coping/Psychosocial   Plan of Care Reviewed With patient;family   OTHER   Outcome Summary medicated w/ turns, pt given IV Keppra. Tolerated well, will continue to monitor and keep comfortable   Plan of Care Review   Progress no change     Goal: Individualization and Mutuality  Outcome: Ongoing (interventions implemented as appropriate)      Problem: Nutrition, Imbalanced: Inadequate Oral Intake (Adult)  Goal: Improved Oral Intake  Outcome: Ongoing (interventions implemented as appropriate)    Goal: Prevent Further Weight Loss  Outcome: Ongoing (interventions implemented as appropriate)      Problem: Confusion, Acute (Adult)  Goal: Cognitive/Functional Impairments Minimized  Outcome: Ongoing (interventions implemented as appropriate)    Goal: Safety  Outcome: Ongoing (interventions implemented as appropriate)      Problem: Palliative Care (Adult)  Goal: Maximized Comfort  Outcome: Ongoing (interventions implemented as appropriate)    Goal: Enhanced Quality of Life  Outcome: Ongoing (interventions implemented as appropriate)

## 2019-03-18 NOTE — DISCHARGE SUMMARY
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 male presented with confusion and inability to speak.  He was found to have a very large left frontal brain lesion, most likely to be metastatic from locally advanced rectosigmoid cancer from 2013.  He had a diverting loop ileostomy performed in 2013.  Medical oncology question primary lung cancer with metastasis but thought that was less likely.  He was found to have many other metastatic lesions in the lungs and abdomen also.  The patient is appropriate for hospice care since he only has days to weeks to live.  This is a very unfortunate situation.  The patient has been seen by oncology and neurosurgery.  Multiple conversations have been had with the family and power of .  He is being discharged from acute care and readmitted as a hospice scattered bed.  He will receive comfort measures and no CPR and comfort measures.    At the time of my examination, the patient did arouse slightly.  No ROS was obtainable from the patient.  The patient did not appear to be in distress.    Consults:   Consults     Date and Time Order Name Status Description    3/14/2019 0808 Hematology & Oncology Inpatient Consult Completed     3/11/2019 1632 Pulmonology (on-call MD unless specified) Completed     3/11/2019 1613 Neurosurgery (on-call  MD unless specified) Completed           Pertinent Test Results:   MRI Brain 3/12/2019  IMPRESSION:  There is a 6.4 x 5.4 x 5.9 cm contrast enhancing mass centered within  the left frontal lobe with considerable surrounding vasogenic edema  within the white matter of the left frontal and parietal lobes extending  to the left external capsule, that is likely representative of a  metastatic focus. There is marked mass effect upon the left lateral  ventricle and there is midline shift to the right by approximately 1 cm.  The right lateral ventricle demonstrates mild enlargement compatible  with a mild degree of entrapment.    CT Chest and Abdomen 3/13/2019  IMPRESSION:  1. The patient has 2 right upper lobe irregular masses concerning for  bronchogenic malignancy. Both lesions may represent synchronous tumors  or a primary and secondary metastatic deposit. There is an additional  small noncalcified nodule within the right lower lobe concerning for a  metastatic lesion. The central right upper lobe lesion is accessible for  bronchoscopic guided biopsy. There is additionally metastatic  mediastinal and right hilar lymphadenopathy. Heterogeneous right adrenal  lesion concerning for metastatic disease. A heterogeneous lesion within  the left distal pancreatic body is also concerning for an additional  site of metastatic disease.  2. There is increased density of fat seen adjacent to the right colon.  The etiology of this is uncertain and may be related to congestion  although peritoneal carcinomatosis cannot be entirely excluded.    Condition on Discharge:  Poor    Vital Signs  Temp:  [98.5 °F (36.9 °C)-99.4 °F (37.4 °C)] 98.5 °F (36.9 °C)  Heart Rate:  [] 83  Resp:  [6-18] 18  BP: ()/(68-76) 113/76    Physical Exam:     General Appearance:    Arouses and appears in no acute distress   Head:    Normocephalic, without obvious abnormality, atraumatic   Eyes:            Lids and lashes normal, conjunctivae and  sclerae normal, no   icterus   Ears:    Ears appear intact with no abnormalities noted   Throat:   No oral lesions, oral mucosa moist   Neck:   Supple, trachea midline, no thyromegaly   Back:     No scoliosis present   Lungs:     Clear to auscultation, diminished, respirations regular, and not labored    Heart:    Regular rhythm and normal rate   Chest Wall:    No abnormalities observed   Abdomen:     Occasional bowel sounds, slightly scaphoid, soft and non-tender, non-distended   Rectal:     Deferred   Extremities:   No edema, no cyanosis   Pulses:   Radial pulses palpable and equal bilaterally   Skin:   No bleeding       Neurologic:   Arouses and per NS previous exam       Discharge Disposition  Hospice/Medical Facility ([facility])    Discharge Medications: Same MAR    Discharge Diet: As tolerated    Activity at Discharge: As tolerated    Follow-up Appointments  No future appointments.      Jorge A Keita MD  03/18/19  7:37 PM    Time: Discharge 45 min

## 2019-03-18 NOTE — PROGRESS NOTES
Discharge Planning Assessment  [facility]     Patient Name: Jamel Rojas  MRN: [id number]  Today's Date: 3/18/2019    Admit Date: 3/11/2019    Discharge Needs Assessment    No documentation.       Discharge Plan     Row Name 03/18/19 1402       Plan    Plan Comments  The patient transferred to [facility] from [address] [place] [address] on 3/16/19 @ 03:00. The patient is palliative. [facility] is trying to reach the sister to set up a time to meet with her tomorrow. The patient's sister can not meet today with [facility]. CCP will continue to follow for any needs that may arise. FARTUN Jimenez RN, CCP.         Destination      No service coordination in this encounter.      Durable Medical Equipment      No service coordination in this encounter.      Dialysis/Infusion      No service coordination in this encounter.      Home Medical Care      No service coordination in this encounter.      Community Resources      No service coordination in this encounter.        Expected Discharge Date and Time     Expected Discharge Date Expected Discharge Time    Mar 15, 2019         Demographic Summary    No documentation.       Functional Status    No documentation.       Psychosocial    No documentation.       Abuse/Neglect    No documentation.       Legal    No documentation.       Substance Abuse    No documentation.       Patient Forms    No documentation.           Asuncion Jimenez RN

## 2019-03-18 NOTE — PLAN OF CARE
Problem: Patient Care Overview  Goal: Plan of Care Review   03/18/19 0406   Coping/Psychosocial   Plan of Care Reviewed With patient   OTHER   Outcome Summary pt slept well through out the night, gave dilaudid and ativan x2 for pain and axiety and premed for insert of a evans. pt tolerated well. will continue to monitor and keep comfortable    Plan of Care Review   Progress declining     Goal: Individualization and Mutuality  Outcome: Ongoing (interventions implemented as appropriate)   03/18/19 0406   Individualization   Patient Specific Goals (Include Timeframe) to keep comfortable    Patient Specific Interventions PRN pain meds, q4 turns, assist as needed with turns        Problem: Confusion, Acute (Adult)  Goal: Cognitive/Functional Impairments Minimized  Outcome: Ongoing (interventions implemented as appropriate)    Goal: Safety  Outcome: Ongoing (interventions implemented as appropriate)      Problem: Palliative Care (Adult)  Goal: Maximized Comfort  Outcome: Ongoing (interventions implemented as appropriate)    Goal: Enhanced Quality of Life  Outcome: Ongoing (interventions implemented as appropriate)

## 2019-03-18 NOTE — PROGRESS NOTES
Discharge Planning Assessment  [facility]     Patient Name: Jamel Rojas  MRN: [id number]  Today's Date: 3/18/2019    Admit Date: 3/11/2019    Discharge Needs Assessment    No documentation.       Discharge Plan     Row Name 03/18/19 7797       Plan    Plan Comments  [facility] and she signed consents for a [facility] scattered bed on 3/18/19. CCP and [facility] will follow for any needs that may arise. FARTUN Jimenez RN, CCP.     Final Discharge Disposition Code  51 - hospice medical facility    Row Name 03/18/19 1406       Plan    Plan Comments  The patient transferred to [facility] from [address] [place] [address] on 3/16/19 @ 03:00. The patient is palliative. [facility] is trying to reach the sister to set up a time to meet with her tomorrow. The patient's [facility] not [facility]. CCP will continue to follow for any needs that may arise. FARTUN Jimenez RN, CCP.         Destination      No service coordination in this encounter.      Durable Medical Equipment      No service coordination in this encounter.      Dialysis/Infusion      No service coordination in this encounter.      Home Medical Care      No service coordination in this encounter.      Community Resources      No service coordination in this encounter.        Expected Discharge Date and Time     Expected Discharge Date Expected Discharge Time    Mar 15, 2019         Demographic Summary    No documentation.       Functional Status    No documentation.       Psychosocial    No documentation.       Abuse/Neglect    No documentation.       Legal    No documentation.       Substance Abuse    No documentation.       Patient Forms    No documentation.           Asuncion Jimenez RN

## 2019-03-18 NOTE — CONSULTS
Arrived on unit, reviewed records and spoke with RANI Montes to discuss patient's disease progression. Met with patient's NOK-Jina to discuss patient's disease progression and goals of care. Provided detailed EOS. Family agreeable to [facility] scattered bed. Obtained consents via NOK-Jina. Spoke with HMD-Dr. Bae who stated patient is eligible for HSB with primary diagnosis of Colon Cancer ([id number]). Spoke with Dr. Keita who stated he would admit patient to [facility] scattered bed today. Updated RANI Montes and Mercedes ABDI. [facility] team will follow up tomorrow.     Thanks for the referral and opportunity to care for this patient.    Juan Miguel Patel RN, BSN  Referral and admission coordinator  [phone]

## 2019-03-19 PROBLEM — Z51.5 HOSPICE CARE PATIENT: Status: ACTIVE | Noted: 2019-01-01

## (undated) DEVICE — THE TORRENT IRRIGATION SCOPE CONNECTOR IS USED WITH THE TORRENT IRRIGATION TUBING TO PROVIDE IRRIGATION FLUIDS SUCH AS STERILE WATER DURING GASTROINTESTINAL ENDOSCOPIC PROCEDURES WHEN USED IN CONJUNCTION WITH AN IRRIGATION PUMP (OR ELECTROSURGICAL UNIT).: Brand: TORRENT

## (undated) DEVICE — TUBING, SUCTION, 1/4" X 10', STRAIGHT: Brand: MEDLINE

## (undated) DEVICE — CANN NASL CO2 TRULINK W/O2 A/

## (undated) DEVICE — Device: Brand: DEFENDO AIR/WATER/SUCTION AND BIOPSY VALVE